# Patient Record
Sex: FEMALE | Race: WHITE | NOT HISPANIC OR LATINO | URBAN - METROPOLITAN AREA
[De-identification: names, ages, dates, MRNs, and addresses within clinical notes are randomized per-mention and may not be internally consistent; named-entity substitution may affect disease eponyms.]

---

## 2017-05-10 ENCOUNTER — IMPORTED ENCOUNTER (OUTPATIENT)
Dept: URBAN - METROPOLITAN AREA CLINIC 1 | Facility: CLINIC | Age: 72
End: 2017-05-10

## 2017-05-10 PROBLEM — H25.13: Noted: 2017-05-10

## 2017-05-10 PROBLEM — H40.1122: Noted: 2017-05-10

## 2017-05-10 PROBLEM — H40.1113: Noted: 2017-05-10

## 2017-05-10 PROCEDURE — 92014 COMPRE OPH EXAM EST PT 1/>: CPT

## 2017-05-10 PROCEDURE — 92133 CPTRZD OPH DX IMG PST SGM ON: CPT

## 2017-09-19 ENCOUNTER — IMPORTED ENCOUNTER (OUTPATIENT)
Dept: URBAN - METROPOLITAN AREA CLINIC 1 | Facility: CLINIC | Age: 72
End: 2017-09-19

## 2017-09-19 PROCEDURE — 92012 INTRM OPH EXAM EST PATIENT: CPT

## 2017-09-19 NOTE — PATIENT DISCUSSION
1. COAG OU- Severe OD/ Moderate  OS -Patient to continue with current gtt regimen(Betagan OU BID). Patient advised to be compliant with gtts. Condition was discussed with patient and patient understands. Will continue to monitor patient for any progression in condition. Patient was advised to call us with any problems questions or concerns. 2.  Cataract OD: Observe for now without intervention. The patient was advised to contact us if any change or worsening of vision3. Pseudophakia OS 4. Return for an appointment in 4 months for 10. with Dr. Jon Almazan.

## 2017-09-20 PROBLEM — H25.11: Noted: 2017-09-20

## 2017-09-20 PROBLEM — H40.1122: Noted: 2017-09-20

## 2017-09-20 PROBLEM — Z96.1: Noted: 2017-09-20

## 2017-09-20 PROBLEM — H40.1113: Noted: 2017-09-20

## 2017-11-02 ENCOUNTER — TELEPHONE (OUTPATIENT)
Dept: ORTHOPEDIC SURGERY | Age: 72
End: 2017-11-02

## 2017-11-02 NOTE — TELEPHONE ENCOUNTER
Patient called asking if Dr. Brian Mchuhg would be able to refer her to a doctor she can see regarding her high blood pressure. She is requesting a call back. Please advise patient at [phone].

## 2017-11-16 NOTE — TELEPHONE ENCOUNTER
PATIENT CALLED AGAIN FOR , AND IS REQUESTING A CALL BACK AS SOON AS POSSIBLE IN REGARDS TO THE MESSAGE SHE LEFT ON 11/02/17; ABOUT BEING REFERRED TO A DOCTOR THAT CAN HELP HER WITH HER BLOOD PRESSURE. PATIENT TEL. [phone].

## 2017-11-20 ENCOUNTER — TELEPHONE (OUTPATIENT)
Dept: ORTHOPEDIC SURGERY | Age: 72
End: 2017-11-20

## 2017-11-20 NOTE — TELEPHONE ENCOUNTER
Pt called and spoke with nurse derrell carolina this past Friday 11/17--   States she gave her information regarding a physician at the [address] [place] [address] location/pcp info?  Pt lost that information     please call pt at,  K#[phone]

## 2017-11-22 ENCOUNTER — OFFICE VISIT (OUTPATIENT)
Dept: FAMILY MEDICINE CLINIC | Age: 72
End: 2017-11-22

## 2017-11-22 VITALS
OXYGEN SATURATION: 98 % | WEIGHT: 181 LBS | SYSTOLIC BLOOD PRESSURE: 192 MMHG | HEIGHT: 63 IN | HEART RATE: 70 BPM | TEMPERATURE: 96.6 F | RESPIRATION RATE: 16 BRPM | DIASTOLIC BLOOD PRESSURE: 103 MMHG | BODY MASS INDEX: 32.07 KG/M2

## 2017-11-22 DIAGNOSIS — N81.10 FEMALE BLADDER PROLAPSE: ICD-10-CM

## 2017-11-22 DIAGNOSIS — E03.9 HYPOTHYROIDISM, UNSPECIFIED TYPE: ICD-10-CM

## 2017-11-22 DIAGNOSIS — Z86.73 HISTORY OF STROKE: ICD-10-CM

## 2017-11-22 DIAGNOSIS — I10 HYPERTENSION, UNSPECIFIED TYPE: Primary | ICD-10-CM

## 2017-11-22 DIAGNOSIS — K63.5 POLYP OF COLON, UNSPECIFIED PART OF COLON, UNSPECIFIED TYPE: ICD-10-CM

## 2017-11-22 DIAGNOSIS — E66.9 OBESITY (BMI 30.0-34.9): ICD-10-CM

## 2017-11-22 RX ORDER — HYDROCHLOROTHIAZIDE 25 MG/1
25 TABLET ORAL DAILY
Qty: 30 TAB | Refills: 0 | Status: SHIPPED | OUTPATIENT
Start: 2017-11-22 | End: 2019-03-19

## 2017-11-22 RX ORDER — HYDROCHLOROTHIAZIDE 25 MG/1
25 TABLET ORAL
Refills: 0 | COMMUNITY
Start: 2017-11-08 | End: 2017-11-22

## 2017-11-22 NOTE — PROGRESS NOTES
INTERNAL MEDICINE INITIAL PROVIDER VISIT    SUBJECTIVE:     Chief Complaint   Patient presents with   [facility] Hypertension     request new BP medication       HPI: 70 y.o. female with PMHx significant for uncontrolled HTN is here for the above chief complaint(s). Hypertension: Today BP is uncontrolled. Taking HCTZ 25 mg twice a week because afraid of \"damage to kidneys. \" Has not had damage to kidneys related to daily HCTZ. No kidney damage related to any BP medicines in past. Concern related to possible side effects. Denies headache, lightheadedness, dizziness, vision changes, chest pain, rapid/irregular heart rate, shortness of breath, cough, abdominal pain, leg swelling, leg pain. \"I can't exercise related to knee pain. Decreased UOP with diovan and losartan. 50.1 oz of water per day. And 50 oz during the night when wakes up with dry mouth. Bladder Prolapse: request PT. Will request records      ROS: AS PER HPI    Current Outpatient Prescriptions   Medication Sig    OTHER TUMERIC    ASCORBATE CALCIUM (VITAMIN C PO) Take  by mouth.  potassium 99 mg tablet Take 99 mg by mouth daily.  hydroCHLOROthiazide (HYDRODIURIL) 25 mg tablet Take 1 Tab by mouth daily.  cholecalciferol, vitamin D3, 2,000 unit tab Take 2,500 Units by mouth daily.  b complex vitamins tablet Take 1 Tab by mouth daily.  BETAGAN 0.5 % ophthalmic solution      No current facility-administered medications for this visit.       Health Maintenance   Topic Date Due    Hepatitis C Screening  1945    BREAST CANCER SCRN MAMMOGRAM  11/25/1995    FOBT Q 1 YEAR AGE 50-75  11/25/1995    GLAUCOMA SCREENING Q2Y  11/25/2010    OSTEOPOROSIS SCREENING (DEXA)  11/25/2010    MEDICARE YEARLY EXAM  11/25/2010    Pneumococcal 65+ Low/Medium Risk (2 of 2 - PPSV23) 11/22/2018    DTaP/Tdap/Td series (2 - Td) 11/22/2027    ZOSTER VACCINE AGE 60>  Addressed    Influenza Age 5 to Adult  Addressed       Medications and Allergies: Reviewed and confirmed in the chart    Past Medical Hx: Reviewed and confirmed in the chart  Past Medical History:   Diagnosis Date    Bronchitis     Dry hair     Glaucoma     Hemorrhoids     Hypertension     Hypothyroidism     Obesity (BMI 30.0-34. 9)     Osteoarthritis     RIGHT KNEE    Stroke [facility]) 1994    DIZZINESS        Family Hx, Surgical Hx, Social Hx: Reviewed and updated in EMR    OBJECTIVE:  Vitals:    11/22/17 1534 11/22/17 1544 11/22/17 1551   BP: (!) 193/112 (!) 192/112 (!) 192/103   Pulse: 70     Resp: 16     Temp: 96.6 °F (35.9 °C)     TempSrc: Oral     SpO2: 98%     Weight: 181 lb (82.1 kg)     Height: 5' 3\" (1.6 m)         BP Readings from Last 3 Encounters:   11/22/17 (!) 192/103   12/28/16 (!) 183/111   12/21/16 (!) 184/103     Wt Readings from Last 3 Encounters:   11/22/17 181 lb (82.1 kg)   12/28/16 171 lb (77.6 kg)   12/21/16 171 lb (77.6 kg)       General: Elderly woman in no acute distress  HEENT: Head is atraumatic normo-cephalic. CVS: Heart regular, rate, and rhythm. Audible S1 and S2. PULM: Lungs clear to auscultation bilaterally. No wheezes, rales or rhonchi. GI: Positive bowel sounds, soft, nondistended, non-tender. EXT: 2+ dorsalis pedis pulses bilaterally. No pedal edema bilaterally  Neuro: Alert and oriented x3. No gross focal deficits appreciable. MSE: Mood irritable, affect congruent and reactive. LABS OUTSIDE REVIEWED:  TO BE SCANNED IN CHART    Nursing Notes Reviewed    ASSESSMENT AND PLAN    ICD-10-CM ICD-9-CM    1. Hypertension, unspecified type I10 401.9 hydroCHLOROthiazide (HYDRODIURIL) 25 mg tablet encuraged to take EVERY DAY  Increase water to 2 liters per day  BMP in 1 week  RTC in 1 week  GIFTY for past records   2. History of stroke Z86.73 V12.54 hydroCHLOROthiazide (HYDRODIURIL) 25 mg tablet  Given warning signs for recurrent CVA   3. Hypothyroidism, unspecified type E03.9 244.9 TSH ml most recent lab  Continue to monitor   4.  Polyp of colon, unspecified part of colon, unspecified type K63.5 211.3 GIFTY signed   5. Obesity (BMI 30.0-34. 9) E66.9 278.00 Discuss next visit   6. Bladder prolapse: Pt to call with name of uro-gynecologist and records    Orders Placed This Encounter    OTHER    ASCORBATE CALCIUM (VITAMIN C PO)    potassium 99 mg tablet    DISCONTD: hydroCHLOROthiazide (HYDRODIURIL) 25 mg tablet    hydroCHLOROthiazide (HYDRODIURIL) 25 mg tablet       Future Appointments  Date Time Provider Kameron Short   11/29/2017 3:30 PM Cielo Dubon MD [id number] [place] [address]       AVS printed and provided to patient    Assessment and plan above discussed with patient, patient voiced understanding and agreement with plan. More than 50% of this 45 min visit was spent counseling the patient face to face about above health conditions      Cielo Dubon M.D.   [address], [address] [place] [address] [phone]  [place] [phone]

## 2017-11-22 NOTE — PATIENT INSTRUCTIONS
Start taking HCTZ 25 mg daily for blood pressure    Max 2 grams of salt per day    Try yoga for exercise 3x per week    RETURN WITH NAME OF URO-GYNECOLOGIST THAT DIAGNOSED WITH YOU WITH BLADDER PROLAPSE SO WE CAN GET RECORDS AND REFER YOU TO PHYSICAL THERAPY      DASH Diet: Care Instructions  Your Care Instructions    The DASH diet is an eating plan that can help lower your blood pressure. DASH stands for Dietary Approaches to Stop Hypertension. Hypertension is high blood pressure. The DASH diet focuses on eating foods that are high in calcium, potassium, and magnesium. These nutrients can lower blood pressure. The foods that are highest in these nutrients are fruits, vegetables, low-fat dairy products, nuts, seeds, and legumes. But taking calcium, potassium, and magnesium supplements instead of eating foods that are high in those nutrients does not have the same effect. The DASH diet also includes whole grains, fish, and poultry. The DASH diet is one of several lifestyle changes your doctor may recommend to lower your high blood pressure. Your doctor may also want you to decrease the amount of sodium in your diet. Lowering sodium while following the DASH diet can lower blood pressure even further than just the DASH diet alone. Follow-up care is a key part of your treatment and safety. Be sure to make and go to all appointments, and call your doctor if you are having problems. It's also a good idea to know your test results and keep a list of the medicines you take. How can you care for yourself at home? Following the DASH diet  · Eat 4 to 5 servings of fruit each day. A serving is 1 medium-sized piece of fruit, ½ cup chopped or canned fruit, 1/4 cup dried fruit, or 4 ounces (½ cup) of fruit juice. Choose fruit more often than fruit juice. · Eat 4 to 5 servings of vegetables each day.  A serving is 1 cup of lettuce or raw leafy vegetables, ½ cup of chopped or cooked vegetables, or 4 ounces (½ cup) of vegetable variety of cut-up vegetables with a low-fat dip as an appetizer instead of chips and dip. ¨ Sprinkle sunflower seeds or chopped almonds over salads. Or try adding chopped walnuts or almonds to cooked vegetables. ¨ Try some vegetarian meals using beans and peas. Add garbanzo or kidney beans to salads. Make burritos and tacos with mashed garcia beans or black beans. Where can you learn more? Go to http://john-gi.info/. Enter I307 in the search box to learn more about \"DASH Diet: Care Instructions. \"  Current as of: September 21, 2016  Content Version: 11.4  © 0966-2092 [employer]. Care instructions adapted under license by [employer] (which disclaims liability or warranty for this information). If you have questions about a medical condition or this instruction, always ask your healthcare professional. [facility] [address] any warranty or liability for your use of this information.

## 2017-11-22 NOTE — PROGRESS NOTES
Chief Complaint   Patient presents with    Establish Care    Hypertension     request new BP medication

## 2017-11-22 NOTE — MR AVS SNAPSHOT
Visit Information Date & Time Provider Department Dept. Phone Encounter #  
 11/22/2017  3:30 PM Tere Sarkar MD [address] [phone] [id number] Follow-up Instructions Return in about 1 week (around 11/29/2017), or if symptoms worsen or fail to improve, for HTN. Upcoming Health Maintenance Date Due Hepatitis C Screening 1945 BREAST CANCER SCRN MAMMOGRAM 11/25/1995 FOBT Q 1 YEAR AGE 50-75 11/25/1995 GLAUCOMA SCREENING Q2Y 11/25/2010 OSTEOPOROSIS SCREENING (DEXA) 11/25/2010 MEDICARE YEARLY EXAM 11/25/2010 Pneumococcal 65+ Low/Medium Risk (2 of 2 - PPSV23) 11/22/2018 DTaP/Tdap/Td series (2 - Td) 11/22/2027 Allergies as of 11/22/2017  Review Complete On: 11/22/2017 By: Tere Sarkar MD  
  
 Severity Noted Reaction Type Reactions Antihistamine [Diphenhydramine Hcl]  03/11/2015    Other (comments) \"stops bladder\" Erythromycin  01/27/2016    Hives Iodine  03/11/2015    Other (comments) Stops bladder Penicillin G  03/11/2015    Rash Sulfur  03/11/2015    Other (comments) \"stops bladder\" Current Immunizations  Never Reviewed No immunizations on file. Not reviewed this visit You Were Diagnosed With   
  
 Codes Comments Hypertension, unspecified type    -  Primary ICD-10-CM: I10 
ICD-9-CM: 401.9 History of stroke     ICD-10-CM: Z86.73 
ICD-9-CM: V12.54 Hypothyroidism, unspecified type     ICD-10-CM: E03.9 ICD-9-CM: 244.9 Polyp of colon, unspecified part of colon, unspecified type     ICD-10-CM: K63.5 ICD-9-CM: 211.3 Obesity (BMI 30.0-34.9)     ICD-10-CM: A10.7 ICD-9-CM: 278.00 Vitals BP Pulse Temp Resp Height(growth percentile) Weight(growth percentile) (!) 192/103 70 96.6 °F (35.9 °C) (Oral) 16 5' 3\" (1.6 m) 181 lb (82.1 kg) LMP SpO2 BMI OB Status Smoking Status (LMP Unknown) 98% 32.06 kg/m2 Hysterectomy Never Smoker Vitals History BMI and BSA Data Body Mass Index Body Surface Area 32.06 kg/m 2 1.91 m 2 Preferred Pharmacy Pharmacy Name Phone [address], [address] [facility] [phone] Your Updated Medication List  
  
   
This list is accurate as of: 11/22/17  4:20 PM.  Always use your most recent med list.  
  
  
  
  
 b complex vitamins tablet Take 1 Tab by mouth daily. BETAGAN 0.5 % ophthalmic solution Generic drug:  levobunolol  
  
 cholecalciferol (vitamin D3) 2,000 unit Tab Take 2,500 Units by mouth daily. hydroCHLOROthiazide 25 mg tablet Commonly known as:  HYDRODIURIL Take 1 Tab by mouth daily. OTHER  
TUMERIC  
  
 potassium 99 mg tablet Take 99 mg by mouth daily. VITAMIN C PO Take  by mouth. Prescriptions Sent to Pharmacy Refills  
 hydroCHLOROthiazide (HYDRODIURIL) 25 mg tablet 0 Sig: Take 1 Tab by mouth daily. Class: Normal  
 Pharmacy: [id number] [place] [address], [facility] #: [phone] Route: Oral  
  
Follow-up Instructions Return in about 1 week (around 11/29/2017), or if symptoms worsen or fail to improve, for HTN. Patient Instructions Start taking HCTZ 25 mg daily for blood pressure Max 2 grams of salt per day Try yoga for exercise 3x per week RETURN WITH NAME OF URO-GYNECOLOGIST THAT DIAGNOSED WITH YOU WITH BLADDER PROLAPSE SO WE CAN GET RECORDS AND REFER YOU TO PHYSICAL THERAPY DASH Diet: Care Instructions Your Care Instructions The DASH diet is an eating plan that can help lower your blood pressure. DASH stands for Dietary Approaches to Stop Hypertension. Hypertension is high blood pressure. The DASH diet focuses on eating foods that are high in calcium, potassium, and magnesium. These nutrients can lower blood pressure.  The foods that are highest in these nutrients are fruits, vegetables, low-fat dairy products, nuts, seeds, and legumes. But taking calcium, potassium, and magnesium supplements instead of eating foods that are high in those nutrients does not have the same effect. The DASH diet also includes whole grains, fish, and poultry. The DASH diet is one of several lifestyle changes your doctor may recommend to lower your high blood pressure. Your doctor may also want you to decrease the amount of sodium in your diet. Lowering sodium while following the DASH diet can lower blood pressure even further than just the DASH diet alone. Follow-up care is a key part of your treatment and safety. Be sure to make and go to all appointments, and call your doctor if you are having problems. It's also a good idea to know your test results and keep a list of the medicines you take. How can you care for yourself at home? Following the DASH diet · Eat 4 to 5 servings of fruit each day. A serving is 1 medium-sized piece of fruit, ½ cup chopped or canned fruit, 1/4 cup dried fruit, or 4 ounces (½ cup) of fruit juice. Choose fruit more often than fruit juice. · Eat 4 to 5 servings of vegetables each day. A serving is 1 cup of lettuce or raw leafy vegetables, ½ cup of chopped or cooked vegetables, or 4 ounces (½ cup) of vegetable juice. Choose vegetables more often than vegetable juice. · Get 2 to 3 servings of low-fat and fat-free dairy each day. A serving is 8 ounces of milk, 1 cup of yogurt, or 1 ½ ounces of cheese. · Eat 6 to 8 servings of grains each day. A serving is 1 slice of bread, 1 ounce of dry cereal, or ½ cup of cooked rice, pasta, or cooked cereal. Try to choose whole-grain products as much as possible. · Limit lean meat, poultry, and fish to 2 servings each day. A serving is 3 ounces, about the size of a deck of cards. · Eat 4 to 5 servings of nuts, seeds, and legumes (cooked dried beans, lentils, and split peas) each week.  A serving is 1/3 cup of nuts, 2 tablespoons of seeds, or ½ cup of cooked beans or peas. · Limit fats and oils to 2 to 3 servings each day. A serving is 1 teaspoon of vegetable oil or 2 tablespoons of salad dressing. · Limit sweets and added sugars to 5 servings or less a week. A serving is 1 tablespoon jelly or jam, ½ cup sorbet, or 1 cup of lemonade. · Eat less than 2,300 milligrams (mg) of sodium a day. If you limit your sodium to 1,500 mg a day, you can lower your blood pressure even more. Tips for success · Start small. Do not try to make dramatic changes to your diet all at once. You might feel that you are missing out on your favorite foods and then be more likely to not follow the plan. Make small changes, and stick with them. Once those changes become habit, add a few more changes. · Try some of the following: ¨ Make it a goal to eat a fruit or vegetable at every meal and at snacks. This will make it easy to get the recommended amount of fruits and vegetables each day. ¨ Try yogurt topped with fruit and nuts for a snack or healthy dessert. ¨ Add lettuce, tomato, cucumber, and onion to sandwiches. ¨ Combine a ready-made pizza crust with low-fat mozzarella cheese and lots of vegetable toppings. Try using tomatoes, squash, spinach, broccoli, carrots, cauliflower, and onions. ¨ Have a variety of cut-up vegetables with a low-fat dip as an appetizer instead of chips and dip. ¨ Sprinkle sunflower seeds or chopped almonds over salads. Or try adding chopped walnuts or almonds to cooked vegetables. ¨ Try some vegetarian meals using beans and peas. Add garbanzo or kidney beans to salads. Make burritos and tacos with mashed garcia beans or black beans. Where can you learn more? Go to http://john-gi.info/. Enter M490 in the search box to learn more about \"DASH Diet: Care Instructions. \" Current as of: September 21, 2016 Content Version: 11.4 © 5066-2348 [employer].  Care instructions adapted under license by [employer]en Calv[employer] (which disclaims liability or warranty for this information). If you have questions about a medical condition or this instruction, always ask your healthcare professional. [address] any warranty or liability for your use of this information. Introducing [facility]! Dear Livan Casper: Thank you for requesting a [employer] account. Our records indicate that you already have an active [employer] account. You can access your account anytime at [email or web address]. [employer] Did you know that you can access your hospital and ER discharge instructions at any time in [employer]? You can also review all of your test results from your hospital stay or ER visit. Additional Information If you have questions, please visit the Frequently Asked Questions section of the [employer] website at [email or web address]/. Remember, [employer] is NOT to be used for urgent needs. For medical emergencies, dial 911. Now available from your iPhone and Android! Please provide this summary of care documentation to your next provider. Your primary care clinician is listed as [id number] [place]. If you have any questions after today's visit, please call [phone].

## 2017-11-24 PROBLEM — E78.5 HLD (HYPERLIPIDEMIA): Status: ACTIVE | Noted: 2017-11-24

## 2017-11-24 PROBLEM — R73.03 PREDIABETES: Status: ACTIVE | Noted: 2017-11-24

## 2017-11-29 ENCOUNTER — OFFICE VISIT (OUTPATIENT)
Dept: FAMILY MEDICINE CLINIC | Age: 72
End: 2017-11-29

## 2017-11-29 ENCOUNTER — HOSPITAL ENCOUNTER (OUTPATIENT)
Dept: LAB | Age: 72
Discharge: HOME | End: 2017-11-29
Payer: COMMERCIAL

## 2017-11-29 VITALS
DIASTOLIC BLOOD PRESSURE: 102 MMHG | OXYGEN SATURATION: 95 % | HEIGHT: 63 IN | SYSTOLIC BLOOD PRESSURE: 180 MMHG | WEIGHT: 177 LBS | RESPIRATION RATE: 16 BRPM | BODY MASS INDEX: 31.36 KG/M2 | TEMPERATURE: 96.4 F | HEART RATE: 78 BPM

## 2017-11-29 DIAGNOSIS — I10 HYPERTENSION, UNSPECIFIED TYPE: Primary | ICD-10-CM

## 2017-11-29 DIAGNOSIS — I10 HYPERTENSION, UNSPECIFIED TYPE: ICD-10-CM

## 2017-11-29 LAB
ANION GAP SERPL CALC-SCNC: 13 MMOL/L (ref 3–18)
BUN SERPL-MCNC: 26 MG/DL (ref 7–18)
BUN/CREAT SERPL: 27 (ref 12–20)
CALCIUM SERPL-MCNC: 9.5 MG/DL (ref 8.5–10.1)
CHLORIDE SERPL-SCNC: 103 MMOL/L (ref 100–108)
CO2 SERPL-SCNC: 24 MMOL/L (ref 21–32)
CREAT SERPL-MCNC: 0.98 MG/DL (ref 0.6–1.3)
GLUCOSE SERPL-MCNC: 109 MG/DL (ref 74–99)
POTASSIUM SERPL-SCNC: 3.8 MMOL/L (ref 3.5–5.5)
SODIUM SERPL-SCNC: 140 MMOL/L (ref 136–145)

## 2017-11-29 PROCEDURE — 80048 BASIC METABOLIC PNL TOTAL CA: CPT | Performed by: INTERNAL MEDICINE

## 2017-11-29 RX ORDER — METOPROLOL SUCCINATE 25 MG/1
25 TABLET, EXTENDED RELEASE ORAL DAILY
Qty: 30 TAB | Refills: 1 | Status: SHIPPED | OUTPATIENT
Start: 2017-11-29 | End: 2018-08-07

## 2017-11-29 NOTE — PATIENT INSTRUCTIONS
START TAKING METOPROLOL XL 25 MG DAILY  CONTINUE HCTZ 25 MG DAILY  We will let you know the results of your blood and urine test when they come in. We will call if abnormal and send a letter if normal.  CHECK BLOOD PRESSURE ONCE A DAY IN MID DAY AFTER SITTING FOR A FEW MINUTES, LEGS UNCROSSED AND BACK RESTING  WRITE NUMBERS DOWN  BRING TO CLINIC NEXT VISIT IN 3 WEEKS       DASH Diet: Care Instructions  Your Care Instructions    The DASH diet is an eating plan that can help lower your blood pressure. DASH stands for Dietary Approaches to Stop Hypertension. Hypertension is high blood pressure. The DASH diet focuses on eating foods that are high in calcium, potassium, and magnesium. These nutrients can lower blood pressure. The foods that are highest in these nutrients are fruits, vegetables, low-fat dairy products, nuts, seeds, and legumes. But taking calcium, potassium, and magnesium supplements instead of eating foods that are high in those nutrients does not have the same effect. The DASH diet also includes whole grains, fish, and poultry. The DASH diet is one of several lifestyle changes your doctor may recommend to lower your high blood pressure. Your doctor may also want you to decrease the amount of sodium in your diet. Lowering sodium while following the DASH diet can lower blood pressure even further than just the DASH diet alone. Follow-up care is a key part of your treatment and safety. Be sure to make and go to all appointments, and call your doctor if you are having problems. It's also a good idea to know your test results and keep a list of the medicines you take. How can you care for yourself at home? Following the DASH diet  · Eat 4 to 5 servings of fruit each day. A serving is 1 medium-sized piece of fruit, ½ cup chopped or canned fruit, 1/4 cup dried fruit, or 4 ounces (½ cup) of fruit juice. Choose fruit more often than fruit juice. · Eat 4 to 5 servings of vegetables each day.  A serving is 1 cup of lettuce or raw leafy vegetables, ½ cup of chopped or cooked vegetables, or 4 ounces (½ cup) of vegetable juice. Choose vegetables more often than vegetable juice. · Get 2 to 3 servings of low-fat and fat-free dairy each day. A serving is 8 ounces of milk, 1 cup of yogurt, or 1 ½ ounces of cheese. · Eat 6 to 8 servings of grains each day. A serving is 1 slice of bread, 1 ounce of dry cereal, or ½ cup of cooked rice, pasta, or cooked cereal. Try to choose whole-grain products as much as possible. · Limit lean meat, poultry, and fish to 2 servings each day. A serving is 3 ounces, about the size of a deck of cards. · Eat 4 to 5 servings of nuts, seeds, and legumes (cooked dried beans, lentils, and split peas) each week. A serving is 1/3 cup of nuts, 2 tablespoons of seeds, or ½ cup of cooked beans or peas. · Limit fats and oils to 2 to 3 servings each day. A serving is 1 teaspoon of vegetable oil or 2 tablespoons of salad dressing. · Limit sweets and added sugars to 5 servings or less a week. A serving is 1 tablespoon jelly or jam, ½ cup sorbet, or 1 cup of lemonade. · Eat less than 2,300 milligrams (mg) of sodium a day. If you limit your sodium to 1,500 mg a day, you can lower your blood pressure even more. Tips for success  · Start small. Do not try to make dramatic changes to your diet all at once. You might feel that you are missing out on your favorite foods and then be more likely to not follow the plan. Make small changes, and stick with them. Once those changes become habit, add a few more changes. · Try some of the following:  ¨ Make it a goal to eat a fruit or vegetable at every meal and at snacks. This will make it easy to get the recommended amount of fruits and vegetables each day. ¨ Try yogurt topped with fruit and nuts for a snack or healthy dessert. ¨ Add lettuce, tomato, cucumber, and onion to sandwiches.   ¨ Combine a ready-made pizza crust with low-fat mozzarella cheese and lots of vegetable toppings. Try using tomatoes, squash, spinach, broccoli, carrots, cauliflower, and onions. ¨ Have a variety of cut-up vegetables with a low-fat dip as an appetizer instead of chips and dip. ¨ Sprinkle sunflower seeds or chopped almonds over salads. Or try adding chopped walnuts or almonds to cooked vegetables. ¨ Try some vegetarian meals using beans and peas. Add garbanzo or kidney beans to salads. Make burritos and tacos with mashed garcia beans or black beans. Where can you learn more? Go to http://john-gi.info/. Enter H898 in the search box to learn more about \"DASH Diet: Care Instructions. \"  Current as of: September 21, 2016  Content Version: 11.4  © 8304-1422 [email or web address]. Care instructions adapted under license by [employer] (which disclaims liability or warranty for this information). If you have questions about a medical condition or this instruction, always ask your healthcare professional. [address] any warranty or liability for your use of this information.

## 2017-11-29 NOTE — PROGRESS NOTES
Internal Medicine Follow Up Visit Note    Chief Complaint   Patient presents with    Hypertension       HPI:  Taylor Chan is a 67 y.o.  female with history significant for uncontrolled HTN is here for the above complaint(s). Hypertension: Today BP is not controlled. Taking HCTZ 25 mg daily. No side effects from medications but does have increased urination. Medication good compliance for last week. Denies headache, lightheadedness, dizziness, vision changes, chest pain, rapid/irregular heart rate, shortness of breath, cough, abdominal pain, leg swelling, leg pain. Trying to drink more water per day. /79 earlier today before appointment. BP log is at home. ROS: as per HPI    Current Outpatient Prescriptions   Medication Sig    metoprolol succinate (TOPROL-XL) 25 mg XL tablet Take 1 Tab by mouth daily.  OTHER TUMERIC    ASCORBATE CALCIUM (VITAMIN C PO) Take  by mouth.  potassium 99 mg tablet Take 99 mg by mouth daily.  hydroCHLOROthiazide (HYDRODIURIL) 25 mg tablet Take 1 Tab by mouth daily.  cholecalciferol, vitamin D3, 2,000 unit tab Take 2,500 Units by mouth daily.  b complex vitamins tablet Take 1 Tab by mouth daily.  BETAGAN 0.5 % ophthalmic solution      No current facility-administered medications for this visit. Health Maintenance   Topic Date Due    Hepatitis C Screening  1945    BREAST CANCER SCRN MAMMOGRAM  11/25/1995    FOBT Q 1 YEAR AGE 50-75  11/25/1995    GLAUCOMA SCREENING Q2Y  11/25/2010    OSTEOPOROSIS SCREENING (DEXA)  11/25/2010    MEDICARE YEARLY EXAM  11/25/2010    Pneumococcal 65+ Low/Medium Risk (2 of 2 - PPSV23) 11/22/2018    DTaP/Tdap/Td series (2 - Td) 11/22/2027    ZOSTER VACCINE AGE 60>  Addressed    Influenza Age 5 to Adult  Addressed       There is no immunization history on file for this patient. Allergies and Medications: Reviewed and updated in EMR.      Past Medical History:   Diagnosis Date    Bronchitis     Dry hair     Glaucoma     Hemorrhoids     Hypertension     Hypothyroidism     Obesity (BMI 30.0-34. 9)     Osteoarthritis     RIGHT KNEE    Stroke [facility]) 1994    DIZZINESS        Family History, Social History, Past Medical History, Surgical History: Reviewed and updated in EMR as appropriate. OBJECTIVE:   Visit Vitals    BP (!) 180/102    Pulse 78    Temp 96.4 °F (35.8 °C) (Oral)    Resp 16    Ht 5' 3\" (1.6 m)    Wt 177 lb (80.3 kg)    LMP  (LMP Unknown)    SpO2 95%    BMI 31.35 kg/m2        BP Readings from Last 3 Encounters:   11/29/17 (!) 180/102   11/22/17 (!) 192/103   12/28/16 (!) 183/111     Wt Readings from Last 3 Encounters:   11/29/17 177 lb (80.3 kg)   11/22/17 181 lb (82.1 kg)   12/28/16 171 lb (77.6 kg)       General: Pleasant elderly woman in no acute distress    Nursing Notes Reviewed. ASSESSMENT/PLAN:      ICD-10-CM ICD-9-CM    1. Hypertension, unspecified type I10 401.9 metoprolol succinate (TOPROL-XL) 25 mg XL tablet  Continue HCTZ 25 mg daily  BP log  DASH diet      METABOLIC PANEL, BASIC  rtc in 3 weeks       Requested Prescriptions     Signed Prescriptions Disp Refills    metoprolol succinate (TOPROL-XL) 25 mg XL tablet 30 Tab 1     Sig: Take 1 Tab by mouth daily. Patient verbalized understanding and agreement with the plan. Patient was given an after-visit summary. Follow-up Disposition: Not on File or sooner if worsening symptoms. More than 50% of this 25 min visit was spent counseling the patient face to face about above health conditions      Siria Keller M.D.   [address], [address] [place] [address], [address] [place] [address] [phone]  [employer] - [phone]

## 2017-11-29 NOTE — MR AVS SNAPSHOT
Visit Information Date & Time Provider Department Dept. Phone Encounter #  
 11/29/2017  3:30 PM Jaiden Kidd MD [address] [facility] [phone] [id number] Follow-up Instructions Return in about 3 weeks (around 12/20/2017) for htn. Your Appointments 11/29/2017  3:30 PM  
ROUTINE CARE with Jaiden Kidd MD  
[address] [facility] ([facility]) Appt Note: Return in about 1 week (around 11/29/2017), or if symptoms worsen or fail to improve, for HTN  
 [address] [facility] [address] [address]  
[phone]  
  
   
 [address] [facility] [address] Upcoming Health Maintenance Date Due Hepatitis C Screening 1945 BREAST CANCER SCRN MAMMOGRAM 11/25/1995 FOBT Q 1 YEAR AGE 50-75 11/25/1995 GLAUCOMA SCREENING Q2Y 11/25/2010 OSTEOPOROSIS SCREENING (DEXA) 11/25/2010 MEDICARE YEARLY EXAM 11/25/2010 Pneumococcal 65+ Low/Medium Risk (2 of 2 - PPSV23) 11/22/2018 DTaP/Tdap/Td series (2 - Td) 11/22/2027 Allergies as of 11/29/2017  Review Complete On: 11/29/2017 By: Cat Dennis LPN Severity Noted Reaction Type Reactions Antihistamine [Diphenhydramine Hcl]  03/11/2015    Other (comments) \"stops bladder\" Erythromycin  01/27/2016    Hives Iodine  03/11/2015    Other (comments) Stops bladder Penicillin G  03/11/2015    Rash Sulfur  03/11/2015    Other (comments) \"stops bladder\" Current Immunizations  Never Reviewed No immunizations on file. Not reviewed this visit You Were Diagnosed With   
  
 Codes Comments Hypertension, unspecified type    -  Primary ICD-10-CM: I10 
ICD-9-CM: 401.9 Vitals BP Pulse Temp Resp Height(growth percentile) Weight(growth percentile) (!) 180/102 78 96.4 °F (35.8 °C) (Oral) 16 5' 3\" (1.6 m) 177 lb (80.3 kg) LMP SpO2 BMI OB Status Smoking Status (LMP Unknown) 95% 31.35 kg/m2 Hysterectomy Never Smoker Vitals History BMI and BSA Data Body Mass Index Body Surface Area  
 31.35 kg/m 2 1.89 m 2 Preferred Pharmacy Pharmacy Name Phone [address], [address] [facility] [phone] Your Updated Medication List  
  
   
This list is accurate as of: 11/29/17  3:26 PM.  Always use your most recent med list.  
  
  
  
  
 b complex vitamins tablet Take 1 Tab by mouth daily. BETAGAN 0.5 % ophthalmic solution Generic drug:  levobunolol  
  
 cholecalciferol (vitamin D3) 2,000 unit Tab Take 2,500 Units by mouth daily. hydroCHLOROthiazide 25 mg tablet Commonly known as:  HYDRODIURIL Take 1 Tab by mouth daily. metoprolol succinate 25 mg XL tablet Commonly known as:  TOPROL-XL Take 1 Tab by mouth daily. OTHER  
TUMERIC  
  
 potassium 99 mg tablet Take 99 mg by mouth daily. VITAMIN C PO Take  by mouth. Prescriptions Sent to Pharmacy Refills  
 metoprolol succinate (TOPROL-XL) 25 mg XL tablet 1 Sig: Take 1 Tab by mouth daily. Class: Normal  
 Pharmacy: [id number], [address] [facility] Ph #: [phone] Route: Oral  
  
Follow-up Instructions Return in about 3 weeks (around 12/20/2017) for htn. To-Do List   
 11/29/2017 Lab:  METABOLIC PANEL, BASIC Patient Instructions START TAKING METOPROLOL XL 25 MG DAILY CONTINUE HCTZ 25 MG DAILY We will let you know the results of your blood and urine test when they come in. We will call if abnormal and send a letter if normal. 
CHECK BLOOD PRESSURE ONCE A DAY IN MID DAY AFTER SITTING FOR A FEW MINUTES, LEGS UNCROSSED AND BACK RESTING 
WRITE NUMBERS DOWN 
BRING TO CLINIC NEXT VISIT IN 3 WEEKS 
 
  
DASH Diet: Care Instructions Your Care Instructions The DASH diet is an eating plan that can help lower your blood pressure. DASH stands for Dietary Approaches to Stop Hypertension. Hypertension is high blood pressure. The DASH diet focuses on eating foods that are high in calcium, potassium, and magnesium. These nutrients can lower blood pressure. The foods that are highest in these nutrients are fruits, vegetables, low-fat dairy products, nuts, seeds, and legumes. But taking calcium, potassium, and magnesium supplements instead of eating foods that are high in those nutrients does not have the same effect. The DASH diet also includes whole grains, fish, and poultry. The DASH diet is one of several lifestyle changes your doctor may recommend to lower your high blood pressure. Your doctor may also want you to decrease the amount of sodium in your diet. Lowering sodium while following the DASH diet can lower blood pressure even further than just the DASH diet alone. Follow-up care is a key part of your treatment and safety. Be sure to make and go to all appointments, and call your doctor if you are having problems. It's also a good idea to know your test results and keep a list of the medicines you take. How can you care for yourself at home? Following the DASH diet · Eat 4 to 5 servings of fruit each day. A serving is 1 medium-sized piece of fruit, ½ cup chopped or canned fruit, 1/4 cup dried fruit, or 4 ounces (½ cup) of fruit juice. Choose fruit more often than fruit juice. · Eat 4 to 5 servings of vegetables each day. A serving is 1 cup of lettuce or raw leafy vegetables, ½ cup of chopped or cooked vegetables, or 4 ounces (½ cup) of vegetable juice. Choose vegetables more often than vegetable juice. · Get 2 to 3 servings of low-fat and fat-free dairy each day. A serving is 8 ounces of milk, 1 cup of yogurt, or 1 ½ ounces of cheese. · Eat 6 to 8 servings of grains each day.  A serving is 1 slice of bread, 1 ounce of dry cereal, or ½ cup of cooked rice, pasta, or cooked cereal. Try to choose whole-grain products as much as possible. · Limit lean meat, poultry, and fish to 2 servings each day. A serving is 3 ounces, about the size of a deck of cards. · Eat 4 to 5 servings of nuts, seeds, and legumes (cooked dried beans, lentils, and split peas) each week. A serving is 1/3 cup of nuts, 2 tablespoons of seeds, or ½ cup of cooked beans or peas. · Limit fats and oils to 2 to 3 servings each day. A serving is 1 teaspoon of vegetable oil or 2 tablespoons of salad dressing. · Limit sweets and added sugars to 5 servings or less a week. A serving is 1 tablespoon jelly or jam, ½ cup sorbet, or 1 cup of lemonade. · Eat less than 2,300 milligrams (mg) of sodium a day. If you limit your sodium to 1,500 mg a day, you can lower your blood pressure even more. Tips for success · Start small. Do not try to make dramatic changes to your diet all at once. You might feel that you are missing out on your favorite foods and then be more likely to not follow the plan. Make small changes, and stick with them. Once those changes become habit, add a few more changes. · Try some of the following: ¨ Make it a goal to eat a fruit or vegetable at every meal and at snacks. This will make it easy to get the recommended amount of fruits and vegetables each day. ¨ Try yogurt topped with fruit and nuts for a snack or healthy dessert. ¨ Add lettuce, tomato, cucumber, and onion to sandwiches. ¨ Combine a ready-made pizza crust with low-fat mozzarella cheese and lots of vegetable toppings. Try using tomatoes, squash, spinach, broccoli, carrots, cauliflower, and onions. ¨ Have a variety of cut-up vegetables with a low-fat dip as an appetizer instead of chips and dip. ¨ Sprinkle sunflower seeds or chopped almonds over salads. Or try adding chopped walnuts or almonds to cooked vegetables. ¨ Try some vegetarian meals using beans and peas.  Add garbanzo or kidney beans to salads. Make burritos and tacos with mashed garcia beans or black beans. Where can you learn more? Go to http://john-gi.info/. Enter K936 in the search box to learn more about \"DASH Diet: Care Instructions. \" Current as of: September 21, 2016 Content Version: 11.4 © 5026-5367 [employer]. Care instructions adapted under license by [employer] (which disclaims liability or warranty for this information). If you have questions about a medical condition or this instruction, always ask your healthcare professional. [place] [address] any warranty or liability for your use of this information. Introducing [facility]! Dear Richa: Thank you for requesting a scoo mobility account. Our records indicate that you already have an active scoo mobility account. You can access your account anytime at [email or web address] Did you know that you can access your hospital and ER discharge instructions at any time in scoo mobility? You can also review all of your test results from your hospital stay or ER visit. Additional Information If you have questions, please visit the Frequently Asked Questions section of the scoo mobility website at [email or web address] Remember, scoo mobility is NOT to be used for urgent needs. For medical emergencies, dial 911. Now available from your iPhone and Android! Please provide this summary of care documentation to your next provider. Your primary care clinician is listed as [facility]. If you have any questions after today's visit, please call [phone].

## 2017-11-30 ENCOUNTER — TELEPHONE (OUTPATIENT)
Dept: FAMILY MEDICINE CLINIC | Age: 72
End: 2017-11-30

## 2017-11-30 NOTE — TELEPHONE ENCOUNTER
Attempted to call patient however patient was still sleeping according to a family member. Best time to reach patient today will be after 12. This is in regards to patient's lab.

## 2017-11-30 NOTE — TELEPHONE ENCOUNTER
Lmom with [facility] [place] to let wife know I was returning call in regard to lab results. Informed him that I will return call tomorrow.

## 2017-12-21 PROBLEM — N39.46 MIXED INCONTINENCE: Status: ACTIVE | Noted: 2017-12-21

## 2018-01-11 PROBLEM — D12.2 ADENOMATOUS POLYP OF ASCENDING COLON: Status: ACTIVE | Noted: 2017-11-22

## 2019-03-19 PROBLEM — R65.10 SIRS (SYSTEMIC INFLAMMATORY RESPONSE SYNDROME) (HCC): Status: ACTIVE | Noted: 2019-03-19

## 2019-04-04 ENCOUNTER — OFFICE VISIT (OUTPATIENT)
Dept: FAMILY MEDICINE CLINIC | Age: 74
End: 2019-04-04

## 2019-04-04 VITALS
HEART RATE: 77 BPM | BODY MASS INDEX: 30.83 KG/M2 | SYSTOLIC BLOOD PRESSURE: 132 MMHG | OXYGEN SATURATION: 96 % | TEMPERATURE: 97.4 F | HEIGHT: 63 IN | WEIGHT: 174 LBS | RESPIRATION RATE: 16 BRPM | DIASTOLIC BLOOD PRESSURE: 76 MMHG

## 2019-04-04 DIAGNOSIS — M17.11 OSTEOARTHRITIS OF RIGHT KNEE, UNSPECIFIED OSTEOARTHRITIS TYPE: ICD-10-CM

## 2019-04-04 DIAGNOSIS — I10 HYPERTENSION, UNSPECIFIED TYPE: ICD-10-CM

## 2019-04-04 DIAGNOSIS — Z86.73 HISTORY OF STROKE: ICD-10-CM

## 2019-04-04 DIAGNOSIS — R41.3 MEMORY DEFICIT: ICD-10-CM

## 2019-04-04 DIAGNOSIS — E78.2 MIXED HYPERLIPIDEMIA: ICD-10-CM

## 2019-04-04 DIAGNOSIS — Z63.4 BEREAVEMENT: ICD-10-CM

## 2019-04-04 DIAGNOSIS — F32.1 CURRENT MODERATE EPISODE OF MAJOR DEPRESSIVE DISORDER WITHOUT PRIOR EPISODE (HCC): Primary | ICD-10-CM

## 2019-04-04 RX ORDER — AMLODIPINE BESYLATE 5 MG/1
5 TABLET ORAL
Qty: 90 TAB | Refills: 1 | Status: SHIPPED | OUTPATIENT
Start: 2019-04-04 | End: 2019-10-01

## 2019-04-04 RX ORDER — MIRTAZAPINE 30 MG/1
30 TABLET, FILM COATED ORAL
Qty: 30 TAB | Refills: 2 | Status: SHIPPED | OUTPATIENT
Start: 2019-04-04 | End: 2019-05-28 | Stop reason: SDUPTHER

## 2019-04-04 RX ORDER — ATORVASTATIN CALCIUM 40 MG/1
40 TABLET, FILM COATED ORAL
Qty: 90 TAB | Refills: 1 | Status: SHIPPED | OUTPATIENT
Start: 2019-04-04 | End: 2019-10-01

## 2019-04-04 SDOH — SOCIAL STABILITY - SOCIAL INSECURITY: DISSAPEARANCE AND DEATH OF FAMILY MEMBER: Z63.4

## 2019-04-04 NOTE — PROGRESS NOTES
INTERNAL MEDICINE INITIAL PROVIDER VISIT SUBJECTIVE:  
 
Chief Complaint Patient presents with [facility]  Depression HPI: 68 y.o. female with PMHx significant for HTN, ?depression/delirium is here for the above chief complaint(s). Last seen 2017. Comes to clinic with son Radha Knight. Hospital Discharge: Hospitalized [facility] 3/18- outside records reviewed. hospitalized  for altered mental status thought 2/2 depression and dehydration +/- CVA. home health set up, nursing set up on hospital discharge, PT started as well for right knee pain. Depression:   2019, onset afterwards. Has neighbor that help with cooking, cleaning and checking in. Pill box filled by son. Declined inpatient treatment for depression which was recommended by telepsychiatrist. Started on mirtazapine which she feels is helping with depressed mood appetite, sleep. Per son improved outlook since discharge from hospital and start of medicine. One missed medicine dose since hospital discharge. per son not cooking for self, meal prep. Eating well when brought food. Trouble staying asleep. Plans to go to grief support group once a month. Interested in talk therapy. Memory: Per son patient does not have trouble with memory. He reports she has some issues with mumbling sometimes. Forgetting peoples names, forgetting appointment days. Son set up autopay to help with bills. Remembers to take medicine, house locked Right knee pain: request cortisone injections in knee for pain. Current Outpatient Medications Medication Sig  
 mirtazapine (REMERON) 30 mg tablet Take 1 Tab by mouth nightly for 90 days.  amLODIPine (NORVASC) 5 mg tablet Take 1 Tab by mouth nightly for 180 days.  atorvastatin (LIPITOR) 40 mg tablet Take 1 Tab by mouth nightly for 180 days.  aspirin delayed-release 81 mg tablet Take 1 Tab by mouth daily.  timolol (TIMOPTIC) 0.5 % ophthalmic solution Administer 1 Drop to both eyes two (2) times a day.  b complex vitamins tablet Take 1 Tab by mouth daily.  cholecalciferol (VITAMIN D3) 1,000 unit cap Take 5,000 Units by mouth daily. No current facility-administered medications for this visit. Health Maintenance Topic Date Due  
 Hepatitis C Screening  1945  Shingrix Vaccine Age 50> (1 of 2) 11/25/1995  BREAST CANCER SCRN MAMMOGRAM  11/25/1995  GLAUCOMA SCREENING Q2Y  11/25/2010  Bone Densitometry (Dexa) Screening  11/25/2010  Pneumococcal 65+ years (2 of 2 - PPSV23) 11/25/2010  COLONOSCOPY  08/07/2016  MEDICARE YEARLY EXAM  10/29/2018  Influenza Age 5 to Adult  08/01/2019  
 DTaP/Tdap/Td series (2 - Td) 11/22/2027 Medications and Allergies: Reviewed and confirmed in the chart Past Medical Hx: Reviewed and confirmed in the chart Past Medical History:  
Diagnosis Date  Bronchitis  Dry hair  Glaucoma  Hemorrhoids  Hypertension  Hypothyroidism  Obesity (BMI 30.0-34. 9)  Osteoarthritis RIGHT KNEE  
 Stroke [facility]) 1994 DIZZINESS Patient Active Problem List  
Diagnosis Code  Primary osteoarthritis of right knee M17.11  
 History of stroke Z86.73  
 Osteoarthritis M19.90  
 Hypertension I10  
 Hypothyroidism E03.9  Hemorrhoids K64.9  
 Glaucoma H40.9  Obesity (BMI 30.0-34. 9) E66.9  Adenomatous polyp of ascending colon D12.2  Female bladder prolapse N81.10  Prediabetes R73.03  
 HLD (hyperlipidemia) E78.5  Mixed incontinence N39.46  
 SIRS (systemic inflammatory response syndrome) (HCC) R65.10  Memory deficit R41.3  Bereavement Z63.4  Current moderate episode of major depressive disorder without prior episode (HCC) F32.1 Family Hx, Surgical Hx, Social Hx: Reviewed and updated in EMR OBJECTIVE: 
Vitals:  
 04/04/19 1054 04/04/19 1213 BP: (!) 129/92 132/76 Pulse: 76 77 Resp: 16 Temp: 97.4 °F (36.3 °C) TempSrc: Oral   
SpO2: 96% Weight: 174 lb (78.9 kg) Height: 5' 3\" (1.6 m) BP Readings from Last 3 Encounters:  
04/04/19 132/76  
03/21/19 157/82  
08/07/18 (!) 176/106 Wt Readings from Last 3 Encounters:  
04/04/19 174 lb (78.9 kg) 03/18/19 173 lb (78.5 kg) 03/19/19 173 lb (78.5 kg) General: Pleasant elderly woman in no acute distress HEENT: Head is atraumatic normo-cephalic. Neck: Supple, no LAD, no palpable thyromegaly. No carotid bruits. CVS: Heart regular, rate, and rhythm. Audible S1 and S2. PULM: Lungs clear to auscultation bilaterally. No wheezes, rales or rhonchi. MSK: + swelling right knee, + TTP, mild warmth, no erythema of fluctuance. decreased ROM of knee. + crepitus. No joint instability, negative to valgus/varus stress negative anterior/posterior drawer testing. EXT: 2+ dorsalis pedis pulses bilaterally. No pedal edema bilaterally Neuro: Alert and oriented x3. Gait steady. .  
MSE: mood dysphoric, affect congruent and reactive. No SI/HI. Some PMR. TP linear goal directed. Fair insight and judgment. PHQ-9: 13, somewhat difficult ALEX-8: 4, somewhat difficult RESULTS: 
Lab Results Component Value Date/Time WBC 13.7 (H) 03/20/2019 05:25 AM  
 HGB 14.3 03/20/2019 05:25 AM  
 HCT 43.9 03/20/2019 05:25 AM  
 PLATELET 413 09/58/5028 05:25 AM  
 
Lab Results Component Value Date/Time Sodium 142 03/20/2019 05:25 AM  
 Potassium 3.5 03/21/2019 10:30 AM  
 Chloride 109 (H) 03/20/2019 05:25 AM  
 CO2 25 03/20/2019 05:25 AM  
 Glucose 86 03/20/2019 05:25 AM  
 BUN 19 03/20/2019 05:25 AM  
 Creatinine 0.7 03/20/2019 05:25 AM  
 
Lab Results Component Value Date/Time Cholesterol, total 175 03/21/2019 10:30 AM  
 HDL Cholesterol 35 (L) 03/21/2019 10:30 AM  
 LDL, calculated 106 03/21/2019 10:30 AM  
 Triglyceride 169 (H) 03/21/2019 10:30 AM  
 
No results found for: GPT Nursing Notes Reviewed ASSESSMENT AND PLAN 
 ICD-10-CM ICD-9-CM 1. Current moderate episode of major depressive disorder without prior episode (HCC) F32.1 296.22 mirtazapine (REMERON) 30 mg tablet Increase mirtazapine to 30 mg daily Try to eat three healthy meals a day Continue bereavement group Given report to hospital precautions REFERRAL TO PSYCHOLOGY [address] [place] [address] 2. Hypertension, unspecified type I10 401.9 amLODIPine (NORVASC) 5 mg tablet 3. Mixed hyperlipidemia E78.2 272.2 atorvastatin (LIPITOR) 40 mg tablet 4. Bereavement Z63.4 V62.82 mirtazapine (REMERON) 30 mg tablet REFERRAL TO PSYCHOLOGY [address] [place] [address] 5. Memory deficit ? Pseudodementia vs dementia R41.3 780.93 REFERRAL TO NEUROLOGY [address] [place] [address] 6. Osteoarthritis of right knee, unspecified osteoarthritis type M17.11 715.96 REFERRAL TO PHYSICIAL MEDICINE REHAB for knee injections 7. History of stroke Z86.73 V12.54 REFERRAL TO NEUROLOGY Orders Placed This Encounter  REFERRAL TO PHYSICIAL MEDICINE REHAB  REFERRAL TO NEUROLOGY  REFERRAL TO [address]  mirtazapine (REMERON) 30 mg tablet  amLODIPine (NORVASC) 5 mg tablet  atorvastatin (LIPITOR) 40 mg tablet Future Appointments Date Time Provider Kameron Short 5/2/2019 10:30 AM Brayan Pillai MD [address] [place] [address] printed and provided to patient Assessment and plan above discussed with patient, patient voiced understanding and agreement with plan. More than 50% of this 40 min visit was spent face to face counseling the patient about the etiology and treatment options for the above health conditions outlined in assessment and plan Joesph Scales M.D. [address], [address] [place], [address] [place] [address] - [phone] Fax - [phone] or [phone]

## 2019-04-04 NOTE — PROGRESS NOTES
Patient is here to establish care with new pcp. Patient does not have a pcp and went through [place] [facility]. Patient needs to see an orthopedic for a knee replacement.  passed in January and is dealing with depression. Depression and anxiety questionnaires given. 1. Have you been to the ER, urgent care clinic since your last visit? Hospitalized since your last visit? Yes [facility] 2. Have you seen or consulted any other health care providers outside of the [address] [place] [address] since your last visit? Include any pap smears or colon screening.  no

## 2019-04-04 NOTE — PATIENT INSTRUCTIONS
Increase mirtazapine to 30 mg daily Try to eat three healthy meals a day We are sending a referral to grief counselor and neurology. You should be called about this in 3-4 weeks. If no word in 4 weeks please give us a call to follow up We are sending a referral to physical medicine and rehab for knee injections. You should be called about this in 3-4 weeks. If no word in 4 weeks please give us a call to follow up Please call us with name of home health organization and contact number Return in 4 weeks for follow up depression Mirtazapine (By mouth) Mirtazapine ([id number]) Treats depression. Brand Name(s): Remeron, Remeron Soltab There may be other brand names for this medicine. When This Medicine Should Not Be Used: This medicine is not right for everyone. Do not use it if you had an allergic reaction to mirtazapine. How to Use This Medicine:  
Tablet, Dissolving Tablet · Take your medicine as directed. Your dose may need to be changed several times to find what works best for you. Your doctor may tell you to take this medicine at bedtime, because it can make you sleepy. · You may need to take this medicine for several weeks before you begin to feel better. · Make sure your hands are dry before you handle the disintegrating tablet. Peel back the foil from the blister pack, then remove the tablet. Do not push the tablet through the foil. Place the tablet in your mouth. After it has melted, swallow or take a drink of water. Do not crush, split, or break the tablet. · This medicine should come with a Medication Guide. Ask your pharmacist for a copy if you do not have one. · Missed dose: Take a dose as soon as you remember. If it is almost time for your next dose, wait until then and take a regular dose. Do not take extra medicine to make up for a missed dose.  
· Store the medicine in a closed container at room temperature, away from heat, moisture, and direct light. Keep the orally disintegrating tablet in the original package until you are ready to take it. Drugs and Foods to Avoid: Ask your doctor or pharmacist before using any other medicine, including over-the-counter medicines, vitamins, and herbal products. · Do not use this medicine and an MAO inhibitor within 14 days of each other. · Some medicines can affect how mirtazapine works. Tell your doctor if you are using any of the following: 
¨ Buspirone, carbamazepine, cimetidine, diazepam, fentanyl, lithium, nefazodone, phenytoin, rifampicin, Eli's wort, tramadol, or tryptophan ¨ Other medicine to treat depression, a triptan medicine to treat migraine headaches, medicine to treat an infection, medicine to treat HIV, or a blood thinner (such as warfarin) · Do not drink alcohol while you are using this medicine. Warnings While Using This Medicine: · Tell your doctor if you are pregnant or breastfeeding, or if you have kidney disease, liver disease, glaucoma, high cholesterol, heart or blood vessel disease, or a history of seizures, heart attack, or stroke. Tell your doctor if you have phenylketonuria. · For some children, teenagers, and young adults, this medicine may increase mental or emotional problems. This may lead to thoughts of suicide and violence. Talk with your doctor right away if you have any thoughts or behavior changes that concern you. Tell your doctor if you or anyone in your family has a history of bipolar disorder or suicide attempts. · This medicine may cause the following problems: 
¨ Serotonin syndrome (may be life-threatening) ¨ Decreased white blood cells, which can affect your body's ability to fight an infection ¨ Low sodium levels in the blood · Do not stop using this medicine suddenly. Your doctor will need to slowly decrease your dose before you stop it completely. · This medicine may make you dizzy or drowsy.  Do not drive or do anything else that could be dangerous until you know how this medicine affects you. Stand or sit up slowly if you feel lightheaded or dizzy. · Your doctor will do lab tests at regular visits to check on the effects of this medicine. Keep all appointments. · Keep all medicine out of the reach of children. Never share your medicine with anyone. Possible Side Effects While Using This Medicine:  
Call your doctor right away if you notice any of these side effects: · Allergic reaction: Itching or hives, swelling in your face or hands, swelling or tingling in your mouth or throat, chest tightness, trouble breathing · Anxiety, restlessness, fast heartbeat, fever, sweating, muscle spasms, nausea, vomiting, diarrhea, seeing or hearing things that are not there · Blistering, peeling, red skin rash · Eye pain, vision changes, seeing halos around lights · Confusion, weakness, muscle twitching · Feeling more excited or energetic than usual 
· Fever, chills, cough, sore throat, body aches · Thoughts of hurting yourself or others, worsening depression, unusual behaviors If you notice these less serious side effects, talk with your doctor: · Dry mouth, constipation · Increased appetite or weight gain · Sleepiness, tiredness If you notice other side effects that you think are caused by this medicine, tell your doctor. Call your doctor for medical advice about side effects. You may report side effects to FDA at 8-497-FDA-1157 © 2017 [address] Information is for End User's use only and may not be sold, redistributed or otherwise used for commercial purposes. The above information is an  only. It is not intended as medical advice for individual conditions or treatments. Talk to your doctor, nurse or pharmacist before following any medical regimen to see if it is safe and effective for you.

## 2019-05-01 ENCOUNTER — TELEPHONE (OUTPATIENT)
Dept: FAMILY MEDICINE CLINIC | Age: 74
End: 2019-05-01

## 2019-05-01 NOTE — TELEPHONE ENCOUNTER
Pt's son [facility] pt will going to [facility] orthopedic on [facility]. [facility] pt was a previous pt there for shoulder. [facility] please fax info to [place] jg [phone].   [facility] phone # is [phone]

## 2019-05-28 ENCOUNTER — OFFICE VISIT (OUTPATIENT)
Dept: FAMILY MEDICINE CLINIC | Age: 74
End: 2019-05-28

## 2019-05-28 VITALS
HEIGHT: 63 IN | BODY MASS INDEX: 30.82 KG/M2 | OXYGEN SATURATION: 97 % | SYSTOLIC BLOOD PRESSURE: 132 MMHG | DIASTOLIC BLOOD PRESSURE: 82 MMHG | HEART RATE: 81 BPM | TEMPERATURE: 95.7 F | RESPIRATION RATE: 17 BRPM

## 2019-05-28 DIAGNOSIS — Z63.4 BEREAVEMENT: Primary | ICD-10-CM

## 2019-05-28 DIAGNOSIS — F32.0 CURRENT MILD EPISODE OF MAJOR DEPRESSIVE DISORDER WITHOUT PRIOR EPISODE (HCC): ICD-10-CM

## 2019-05-28 DIAGNOSIS — Z91.81 STATUS POST FALL: ICD-10-CM

## 2019-05-28 RX ORDER — MIRTAZAPINE 30 MG/1
30 TABLET, FILM COATED ORAL
Qty: 90 TAB | Refills: 1 | Status: SHIPPED | OUTPATIENT
Start: 2019-05-28 | End: 2019-11-24

## 2019-05-28 SDOH — SOCIAL STABILITY - SOCIAL INSECURITY: DISSAPEARANCE AND DEATH OF FAMILY MEMBER: Z63.4

## 2019-05-28 NOTE — PROGRESS NOTES
Internal Medicine Follow Up Visit Note    Chief Complaint   Patient presents with    Anxiety     follow up    Depression     follow up       HPI:  Maximus Coley is a 68 y.o.  female with history significant for cognitive impairment, bereavement, HTN, HLD is here for the above complaint(s). Comes to clinic with son. Anxiety and depression: since last visit mood is \"better\", feeling \"alright\" today. Feels \"I don't seem to be as depressed as I was. \" Not going to bereavement group. Did increase mirtazapine to 30 mg daily with good impact on mood No side effects. Eating 2 meals per day, Has not schedule with therapist referred after last visit. Per son, noticed positive change in mood, eating more. Improved conversation engagement. Knee Pain: has appointment with pain mgmt scheduled for today. Memory: June 7, 2019 appointment with neurology. Difficulty with filling pill box once a week. Fall: [place] forward when not using cane and answered door. No head pain. No LOC. Positive bursing on top lip improving but continues to hurt \"a little bit. \"     Discussed [facility] leaving practice July 2019. Current Outpatient Medications   Medication Sig    mirtazapine (REMERON) 30 mg tablet Take 1 Tab by mouth nightly for 180 days.  amLODIPine (NORVASC) 5 mg tablet Take 1 Tab by mouth nightly for 180 days.  atorvastatin (LIPITOR) 40 mg tablet Take 1 Tab by mouth nightly for 180 days.  aspirin delayed-release 81 mg tablet Take 1 Tab by mouth daily.  timolol (TIMOPTIC) 0.5 % ophthalmic solution Administer 1 Drop to both eyes two (2) times a day.  cholecalciferol (VITAMIN D3) 1,000 unit cap Take 5,000 Units by mouth daily.  b complex vitamins tablet Take 1 Tab by mouth daily. No current facility-administered medications for this visit.       Health Maintenance   Topic Date Due    Hepatitis C Screening  1945    Shingrix Vaccine Age 50> (1 of 2) 11/25/1995    BREAST CANCER SCRN MAMMOGRAM  11/25/1995    GLAUCOMA SCREENING Q2Y  11/25/2010    Bone Densitometry (Dexa) Screening  11/25/2010    Pneumococcal 65+ years (2 of 2 - PPSV23) 11/25/2010    COLONOSCOPY  08/07/2016    MEDICARE YEARLY EXAM  10/29/2018    Influenza Age 9 to Adult  08/01/2019    DTaP/Tdap/Td series (2 - Td) 11/22/2027       There is no immunization history on file for this patient. Allergies and Medications: Reviewed and updated in EMR. Patient Active Problem List   Diagnosis Code    Primary osteoarthritis of right knee M17.11    History of stroke Z86.73    Osteoarthritis M19.90    Hypertension I10    Hypothyroidism E03.9    Hemorrhoids K64.9    Glaucoma H40.9    Obesity (BMI 30.0-34. 9) E66.9    Adenomatous polyp of ascending colon D12.2    Female bladder prolapse N81.10    Prediabetes R73.03    HLD (hyperlipidemia) E78.5    Mixed incontinence N39.46    SIRS (systemic inflammatory response syndrome) (HCC) R65.10    Memory deficit R41.3    Bereavement Z63.4    Current moderate episode of major depressive disorder without prior episode (HCC) F32.1    Current mild episode of major depressive disorder without prior episode ([facility] [address] F32.0       Family History, Social History, Past Medical History, Surgical History: Reviewed and updated in EMR as appropriate. OBJECTIVE:   Visit Vitals  /82   Pulse 81   Temp 95.7 °F (35.4 °C) (Oral)   Resp 17   Ht 5' 3\" (1.6 m)   LMP  (LMP Unknown)   SpO2 97%   BMI 30.82 kg/m²        BP Readings from Last 3 Encounters:   05/28/19 132/82   04/04/19 132/76   03/21/19 157/82     Wt Readings from Last 3 Encounters:   04/04/19 174 lb (78.9 kg)   03/18/19 173 lb (78.5 kg)   03/19/19 173 lb (78.5 kg)     bruising maxillary lip    General: Pleasant elderly woman in no acute distress  HEENT: Head is normo-cephalic bruising upper lip with mucosal injuring, healing, slight TTP. No erythema, drainage fluctuance. some swelling. Per patient improving.   Neck: Supple, no LAD  Neuro: Alert and oriented x3. Gait steady. .   MSE: mood dysphoric, affect congruent and reactive. No SI/HI. Some PMR. TP linear goal directed. Fair insight and judgment.      4/4/2019 PHQ-9: 13, somewhat difficult  4/4/2019 ALEX-8: 4, somewhat difficult    5/28/2019 PHQ-9: 2, not difficult at all  5/28/2019 ALEX-7: 4, not difficult at all      Nursing Notes Reviewed. LABS/RADIOLOGICAL TESTS:    Lab Results   Component Value Date/Time    WBC 13.7 (H) 03/20/2019 05:25 AM    HGB 14.3 03/20/2019 05:25 AM    HCT 43.9 03/20/2019 05:25 AM    PLATELET 269 67/28/8104 05:25 AM     Lab Results   Component Value Date/Time    Sodium 142 03/20/2019 05:25 AM    Potassium 3.5 03/21/2019 10:30 AM    Chloride 109 (H) 03/20/2019 05:25 AM    CO2 25 03/20/2019 05:25 AM    Glucose 86 03/20/2019 05:25 AM    BUN 19 03/20/2019 05:25 AM    Creatinine 0.7 03/20/2019 05:25 AM     Lab Results   Component Value Date/Time    Cholesterol, total 175 03/21/2019 10:30 AM    HDL Cholesterol 35 (L) 03/21/2019 10:30 AM    LDL, calculated 106 03/21/2019 10:30 AM    Triglyceride 169 (H) 03/21/2019 10:30 AM     No results found for: GPT    All lab results and radiological studies were reviewed and discussed with the patient. ASSESSMENT/PLAN:      ICD-10-CM ICD-9-CM    1. Bereavement Z63.4 V62.82 mirtazapine (REMERON) 30 mg tablet   2. Current mild episode of major depressive disorder without prior episode (HCC) F32.0 296.21 mirtazapine (REMERON) 30 mg tablet  Given number for talk therapy, recommneded talk therapy and/or bereavement group  Healthy diet and exercise encouraged   3. Status post fall Z91.81 V15.88 CT HEAD WO CONT  Encouraged to use cane at all times       Requested Prescriptions     Signed Prescriptions Disp Refills    mirtazapine (REMERON) 30 mg tablet 90 Tab 1     Sig: Take 1 Tab by mouth nightly for 180 days. Patient verbalized understanding and agreement with the plan.     Patient was given an after-visit summary. Follow-up and Dispositions    · Return in about 2 months (around 7/28/2019) for new PCP, anxiety/depression. or sooner if worsening symptoms. More than 50% of this 25 min visit was spent counseling the patient face to face about etiology and treatment of health conditions outlined in assessment and plan. Brandon Lino M.D.   [place] [phone]  [place] [phone]  [phone]

## 2019-05-28 NOTE — PROGRESS NOTES
Mary Knox is a 68 y.o. female presents in office for    Chief Complaint   Patient presents with    Anxiety     follow up    Depression     follow up        Health Maintenance Due   Topic Date Due    Hepatitis C Screening  1945    Shingrix Vaccine Age 50> (1 of 2) 11/25/1995    BREAST CANCER SCRN MAMMOGRAM  11/25/1995    GLAUCOMA SCREENING Q2Y  11/25/2010    Bone Densitometry (Dexa) Screening  11/25/2010    Pneumococcal 65+ years (2 of 2 - PPSV23) 11/25/2010    COLONOSCOPY  08/07/2016    MEDICARE YEARLY EXAM  10/29/2018       1. Have you been to the ER, urgent care clinic since your last visit? Hospitalized since your last visit? No    2. Have you seen or consulted any other health care providers outside of the [address] [place] [address] since your last visit? Include any pap smears or colon screening.  No     Learning Assessment 11/22/2017   PRIMARY LEARNER Patient   HIGHEST LEVEL OF EDUCATION - PRIMARY LEARNER  SOME COLLEGE   BARRIERS PRIMARY LEARNER NONE   CO-LEARNER CAREGIVER No   PRIMARY LANGUAGE ENGLISH   LEARNER PREFERENCE PRIMARY READING   ANSWERED BY patient   RELATIONSHIP SELF

## 2019-05-28 NOTE — PATIENT INSTRUCTIONS
PLEASE USE PILL BOX TO HELP WITH MEDICATIONS, FILL BOX ONCE A WEEK TO HELP WITH SAFELY TAKING MEDICATIONS Follow up with talk therapist below [facility] [address] [place], [address] Phone: [phone] Fax: [phone] BRING COMPLETED ADVANCED CARE PLANNING FORM BACK WHEN COMPLETED Grief (Actual/Anticipated): Care Instructions Your Care Instructions Grief is your emotional reaction to a major loss. The words \"sorrow\" and \"heartache\" often are used to describe feelings of grief. You feel grief when you lose a beloved person, pet, place, or thing. It is also natural to feel grief when you lose a valued way of life, such as a job, marriage, or good health. You may begin to grieve before a loss occurs. You may grieve for a loved one who is sick and dying. Children and adults often feel the pain of loss before a big move or divorce. This type of grief helps you get ready for a loss. Grief is different for each person. There is no \"normal\" or \"expected\" period of time for grieving. Some people adjust to their loss within a couple of months. Others may take 2 years or longer, especially if their lives were changed a lot or if the loss was sudden and shocking. Grieving can cause problems such as headaches, loss of appetite, and trouble with thinking or sleeping. You may withdraw from friends and family and behave in ways that are unusual for you. Grief may cause you to question your beliefs and views about life. Grief is natural and does not require medical treatment. But if you have trouble sleeping, it may help to take sleeping pills for a short time. It may help to talk with people who have been through or are going through similar losses. You may also want to talk to a counselor about your feelings. Talking about your loss, sharing your cares and concerns, and getting support from others are important parts of healthy grieving. Follow-up care is a key part of your treatment and safety. Be sure to make and go to all appointments, and call your doctor if you are having problems. It's also a good idea to know your test results and keep a list of the medicines you take. How can you care for yourself at home? · Get enough sleep. Your mind helps make sense of your life while you sleep. Missing sleep can lead to illness and make it harder for you to deal with your grief. · Eat healthy foods. Try to avoid eating only foods that give you comfort. Ask someone to join you for a meal if you do not like eating alone. Consider taking a multivitamin every day. · Get some exercise every day. Even a walk can help you deal with your grief. Other exercises, such as yoga, can also help you manage stress. · Comfort yourself. Take time to look at photos or use special items that make you feel better. · Stay involved in your life. Do not withdraw from the activities you enjoy. People you know at work, [employer], clubs, or other groups can help you get through your period of grief. · Think about joining a support group to help you deal with your grief. There are many support groups to help people recover from grief. When should you call for help? Call 911 anytime you think you may need emergency care. For example, call if: 
  · You feel you cannot stop from hurting yourself or someone else.  
 Watch closely for changes in your health, and be sure to contact your doctor if: 
  · You think you may be depressed.  
  · You do not get better as expected. Where can you learn more? Go to http://john-gi.info/. Enter H249 in the search box to learn more about \"Grief (Actual/Anticipated): Care Instructions. \" Current as of: April 18, 2018 Content Version: 11.9 © 3279-0110 [employer].  Care instructions adapted under license by [employer] (which disclaims liability or warranty for this information). If you have questions about a medical condition or this instruction, always ask your healthcare professional. [place] [address] any warranty or liability for your use of this information.

## 2019-05-28 NOTE — ACP (ADVANCE CARE PLANNING)
Advance Care Planning (ACP) Provider Note - Comprehensive     Date of ACP Conversation: 05/28/19  Persons included in Conversation:  patient and family  Length of ACP Conversation in minutes:  <16 minutes (Non-Billable)    Authorized Decision Maker (if patient is incapable of making informed decisions): This person is:  N/A            General ACP for ALL Patients with Decision Making Capacity:   Importance of advance care planning, including choosing a healthcare agent to communicate patient's healthcare decisions if patient lost the ability to make decisions, such as after a sudden illness or accident    Review of Existing Advance Directive:  N/A    For Serious or Chronic Illness:  Understanding of medical condition    Understanding of CPR, goals and expected outcomes, benefits and burdens discussed.     Interventions Provided:  Recommended completion of Advance Directive form after review of ACP materials and conversation with prospective healthcare agent

## 2019-06-07 ENCOUNTER — OFFICE VISIT (OUTPATIENT)
Dept: NEUROLOGY | Age: 74
End: 2019-06-07

## 2019-06-07 VITALS
HEIGHT: 63 IN | SYSTOLIC BLOOD PRESSURE: 124 MMHG | RESPIRATION RATE: 20 BRPM | BODY MASS INDEX: 25.48 KG/M2 | WEIGHT: 143.8 LBS | DIASTOLIC BLOOD PRESSURE: 90 MMHG | HEART RATE: 97 BPM | TEMPERATURE: 98.2 F | OXYGEN SATURATION: 97 %

## 2019-06-07 DIAGNOSIS — F32.9 REACTIVE DEPRESSION: ICD-10-CM

## 2019-06-07 DIAGNOSIS — R41.3 MEMORY DEFICIT: Primary | ICD-10-CM

## 2019-06-07 DIAGNOSIS — I69.319 CVA, OLD, COGNITIVE DEFICITS: ICD-10-CM

## 2019-06-07 RX ORDER — ASPIRIN 325 MG
325 TABLET, DELAYED RELEASE (ENTERIC COATED) ORAL DAILY
Qty: 30 TAB | Refills: 11
Start: 2019-06-07 | End: 2020-08-13 | Stop reason: ALTCHOICE

## 2019-06-07 NOTE — PROGRESS NOTES
Morris Mcdaniels is a 68 y.o. female new patient in today to discuss h/o CVA and memory deficits; referred by Marquis Penny MD.    Learning assessment previously completed 11/22/2017; primary language is [place].

## 2019-06-07 NOTE — LETTER
6/7/19 Patient: Ronald Dexter YOB: 1945 Date of Visit: 6/7/2019 Brandon Lino MD 
[address] [place] [address] [address] [facility] [address] VIA In Basket Dear Brandon Lino MD, Thank you for referring Ms. Ronald Dexter to [facility] for evaluation. My notes for this consultation are attached. If you have questions, please do not hesitate to call me. I look forward to following your patient along with you.  
 
 
Sincerely, 
 
Alessandra Baker MD

## 2019-06-07 NOTE — PROGRESS NOTES
Ivett Batista is a 68 y.o., right handed female, with an estabblished history of hypertension, stroke back in , who comes in for evaluation of her old stroke and memory lose. Her  of many years passed away in January of this year. She was significantly depressed and subsequently was hospitalized in March. She was found to have some new strokes on MRI imaging. She was also noted to have signs of depression, dehydration and urinary tract infection. Since her hospital discharge she has improved dramatically once placed on the anti-depressant. What is new is she seems to have some memory issues. She's very forgetful and needs reminding on occasions. She's still driving and has not been getting lost.  She's actually quite good with directions. There's no weakness in her arms and legs. No vision changes, no language difficulty. She did have some slurring of her words at onset, but that has improved. Social History; she's a , recently. Was  for 47 years, now living alone. She doesn't smoke, drink or use illicit drugs. Worked sales, now retired. Family History; mother  from cancer, had hypertension. Father had ALS. Siblings  from leukemia. Current Outpatient Medications   Medication Sig Dispense Refill    amLODIPine (NORVASC) 5 mg tablet Take 1 Tab by mouth nightly for 180 days. 90 Tab 1    aspirin delayed-release 81 mg tablet Take 1 Tab by mouth daily. 100 Tab 3    mirtazapine (REMERON) 30 mg tablet Take 1 Tab by mouth nightly for 180 days. 90 Tab 1    atorvastatin (LIPITOR) 40 mg tablet Take 1 Tab by mouth nightly for 180 days. 90 Tab 1    timolol (TIMOPTIC) 0.5 % ophthalmic solution Administer 1 Drop to both eyes two (2) times a day.  cholecalciferol (VITAMIN D3) 1,000 unit cap Take 5,000 Units by mouth daily.  b complex vitamins tablet Take 1 Tab by mouth daily.          Past Medical History:   Diagnosis Date    Bronchitis     Dry hair     Glaucoma     Hemorrhoids     Hypertension     Hypothyroidism     Obesity (BMI 30.0-34. 9)     Osteoarthritis     RIGHT KNEE    Stroke ([facility] [address] 1994    DIZZINESS        Past Surgical History:   Procedure Laterality Date    HX APPENDECTOMY      HX HEENT      jaw surgery    HX HYSTERECTOMY         Allergies   Allergen Reactions    Losartan Other (comments)     anuria    Antihistamine [Diphenhydramine Hcl] Other (comments)     \"stops bladder\"    Erythromycin Hives    Iodine Other (comments)     Stops bladder    Penicillin G Rash    Sulfa (Sulfonamide Antibiotics) Other (comments) and Unknown (comments)    Sulfur Other (comments)     \"stops bladder\"       Patient Active Problem List   Diagnosis Code    Primary osteoarthritis of right knee M17.11    History of stroke Z86.73    Osteoarthritis M19.90    Hypertension I10    Hypothyroidism E03.9    Hemorrhoids K64.9    Glaucoma H40.9    Obesity (BMI 30.0-34. 9) E66.9    Adenomatous polyp of ascending colon D12.2    Female bladder prolapse N81.10    Prediabetes R73.03    HLD (hyperlipidemia) E78.5    Mixed incontinence N39.46    SIRS (systemic inflammatory response syndrome) ([facility]) R65.10    Memory deficit R41.3    Bereavement Z63.4    Current moderate episode of major depressive disorder without prior episode ([facility]) F32.1    Current mild episode of major depressive disorder without prior episode ([facility] [address] F32.0         Review of Systems:   As above otherwise 11 point review of systems negative including;   Constitutional no fever or chills  Skin denies rash or itching  HENT  Denies tinnitus, hearing lose  Eyes denies diplopia vision lose  Respiratory denies shortness of breath  Cardiovascular denies chest pain, dyspnea on exertion  Gastrointestinal denies nausea, vomiting, diarrhea, constipation  Genitourinary denies incontinence  Musculoskeletal denies joint pain or swelling  Endocrine denies weight change  Hematology denies easy bruising or bleeding Neurological as above in HPI      PHYSICAL EXAMINATION:      VITAL SIGNS:    Visit Vitals  /90 (BP 1 Location: Left arm, BP Patient Position: Sitting)   Pulse 97   Temp 98.2 °F (36.8 °C) (Oral)   Resp 20   Ht 5' 3\" (1.6 m)   Wt 65.2 kg (143 lb 12.8 oz)   LMP  (LMP Unknown)   SpO2 97%   BMI 25.47 kg/m²       GENERAL: The patient is well developed, well nourished, and in no apparent distress. EXTREMITIES: No clubbing, cyanosis, or edema is identified. Pulses 2+ and symmetrical.  Muscle tone is normal.  HEAD:   Ear, nose, and throat appear to be without trauma. The patient is normocephalic. NEUROLOGIC EXAMINATION    MENTAL STATUS: The patient is awake, alert, and oriented x 4. Fund of knowledge is adequate. Speech is fluent and memory appears to be poor, getting only 1/3 items and scoring only 26/30 on the MMSE. CRANIAL NERVES: II - Visual fields are full to confrontation. Funduscopic examination reveals flat disks bilaterally. Pupils are both 3 mm and briskly reactive to light and accommodation. III, IV, VI - Extraocular movements are intact and there is no nystagmus. V - Facial sensation is intact to pinprick and light touch. VII - Face is symmetrical.   VIII - Hearing is present. IX, X, XII- Palate rises symmetrically. Gag is present. Tongue is in the midline. XI - Shoulder shrugging and head turning intact  MOTOR:  The patient is 5/5 in all four limbs without any drift. Fine finger movements are symmetrical.  Isolated motor group testing reveals no focal abnormalities. Tone is normal.  Sensory examination is intact to pinprick, light touch and position sense testing. Reflexes are 2+ and symmetrical. Plantars are down going. Cerebellar examination reveals no gross ataxia or dysmetria. Gait is antalgic, limping off of the right knee. Final result (Exam End: 3/19/2019 22:37) Provider Status: Open   Study Result     INDICATION: acute encephalopathy r/o cva.       COMPARISON: Head CT 3/18/2019. TECHNIQUE: Multiplanar, multisequence MRI brain without IV contrast.     FINDINGS:     Small area of elevated DWI signal in the left cerebellar hemisphere with near  complete normalization of signal on the ADC map, consistent with subacute  infarct. Multiple tiny foci of elevated signal in the left thalamus and internal  capsule with decreased signal on the ADC map, consistent with acute infarcts. Old lacunar infarct in the right corona radiata.     Brain volume is within normal limits for age. There are multiple scattered foci  of chronic small vessel ischemic changes in the white matter. These are not  unusual for patient age. There is no brain parenchymal mass, hemorrhage, or  hydrocephalus. Multiple punctate foci susceptibility artifact in the bilateral  basal ganglia, corona radiata, brainstem, and cerebellum, consistent with  hemosiderin staining from remote hypertensive microhemorrhages. The sella,  pineal region, and craniocervical junction are unremarkable. Skull base flow  voids are patent.      Mild mucosal thickening in the ethmoid air cells.     IMPRESSION  IMPRESSION:     1.  Multiple tiny acute lacunar infarcts in the left thalamus and internal  capsule. 2.  Small subacute lacunar infarct in the left cerebellar hemisphere. 3.  Old lacunar infarct in the right corona radiata. 4.  Mild chronic microvascular ischemic changes. 5.  Multiple punctate foci susceptibility artifact, consistent with remote  hypertensive microhemorrhages.            I have reviewed the above imagines myself.        CBC:   Lab Results   Component Value Date/Time    WBC 13.7 (H) 03/20/2019 05:25 AM    RBC 5.04 03/20/2019 05:25 AM    HGB 14.3 03/20/2019 05:25 AM    HCT 43.9 03/20/2019 05:25 AM    PLATELET 360 02/37/0002 05:25 AM     BMP:   Lab Results   Component Value Date/Time    Glucose 86 03/20/2019 05:25 AM    Sodium 142 03/20/2019 05:25 AM    Potassium 3.5 03/21/2019 10:30 AM    Chloride 109 (H) 03/20/2019 05:25 AM    CO2 25 03/20/2019 05:25 AM    BUN 19 03/20/2019 05:25 AM    Creatinine 0.7 03/20/2019 05:25 AM    Calcium 9.2 03/20/2019 05:25 AM     CMP:   Lab Results   Component Value Date/Time    Glucose 86 03/20/2019 05:25 AM    Sodium 142 03/20/2019 05:25 AM    Potassium 3.5 03/21/2019 10:30 AM    Chloride 109 (H) 03/20/2019 05:25 AM    CO2 25 03/20/2019 05:25 AM    BUN 19 03/20/2019 05:25 AM    Creatinine 0.7 03/20/2019 05:25 AM    Calcium 9.2 03/20/2019 05:25 AM    Anion gap 7 03/20/2019 05:25 AM    BUN/Creatinine ratio 27 (H) 11/29/2017 03:27 PM    Alk. phosphatase 82 03/20/2019 05:25 AM    Protein, total 6.6 03/20/2019 05:25 AM    Albumin 2.7 (L) 03/20/2019 05:25 AM    Globulin 3.5 06/09/2010 01:49 PM    A-G Ratio 1.1 06/09/2010 01:49 PM     Coagulation:   Lab Results   Component Value Date/Time    Prothrombin time 13.2 (H) 03/19/2019 06:07 AM    INR 1.1 03/19/2019 06:07 AM     Cardiac markers: No results found for: CPK, CKND1, ERENDIRA     3/20/2019  6:34 AM - Jose Luis, [facility]     [facility] Ref Range Units Status   Hemoglobin A1c 5.8   4.2 - 6.3 % Final   Comment:     3/21/2019 11:53 AM - Jose Luis, [facility]     [facility] Ref Range Units Status   Cholesterol, total 175   140 - 199 mg/dl Final   HDL Cholesterol 35  Low   40 - 96 mg/dl Final   Triglyceride 169  High   29 - 150 mg/dl Final   LDL, calculated 106   0 - 130 mg/dl Final   Comment:   TARGET/DECISION VALUES DEPEND ON A NUMBER OF RISK FACTORS. LDL CALCULATION NOT VALID IF TRIGLYCERIDES ARE GREATER THAN 400 mg/dL. CHOL/HDL Ratio 5.0  High   0.0 - 4.4 Ratio Final         Impression: Very complicated situation of the patient who suffered a stroke cortical in nature, memory issues who has risk factors including stroke, reactive depression as well as possible dementia. An impossible situation to tell exactly what is going on yet since all 3 risk factors may be playing into having her cognition not working well.   She is definitely had a stroke in the left subcortex that could be giving her some language dysfunction. Nothing dramatic is being picked up on examination but this could be playing into her \"memory trouble\". Reactive depression can always need to to a pseudodementia of depression. Also a straightforward dementia could be part of this picture. Plan: I would like to get a CT angiogram to make sure she does not have any large vessel vascular occlusion. That seems to have not been done when she was hospitalized. I advised her to increase her aspirin therapy from 81 mg up to 325 mg a day. She needs to have her blood pressure maintained below 130/80, her LDL cholesterol needs to be kept below 70. I would see her back here in 8 weeks and reevaluate her and determine whether further neuropsychological testing is warranted at that time. I think it is too close to when she was placed on the antidepressants to say that this is not part of her depression and I certainly do not want to put her through about testing at this time. I will see her back here in 8 weeks. Thank you for allowing me to evaluate this patient. PLEASE NOTE:   This document has been produced using voice recognition software. Unrecognized errors in transcription may be present.

## 2019-06-10 ENCOUNTER — TELEPHONE (OUTPATIENT)
Dept: FAMILY MEDICINE CLINIC | Age: 74
End: 2019-06-10

## 2019-06-10 DIAGNOSIS — E78.2 MIXED HYPERLIPIDEMIA: Primary | ICD-10-CM

## 2019-06-10 DIAGNOSIS — Z86.73 HISTORY OF STROKE: ICD-10-CM

## 2019-06-10 NOTE — TELEPHONE ENCOUNTER
LPN please call patient and inform blood work needs to be completed prior to appointment with OWEN Anand, fasting labs including lipids. Labs ordered, can take to [facility] or [facility]. Neurology note reviewed:  Goal BP <130/80 LDL goal <70  lipids to be rechecked, started statin during hospitalization 3/2019. Nigel Smith M.D.   [address], [address] [place] [address], [address] [place] [address]   [facility] -   [phone]

## 2019-06-11 ENCOUNTER — TELEPHONE (OUTPATIENT)
Dept: FAMILY MEDICINE CLINIC | Age: 74
End: 2019-06-11

## 2019-06-11 NOTE — TELEPHONE ENCOUNTER
Lisa linder @ [facility] for CAT scans states not able to do pt's CAT Scan. States please call to discuss ASAP. [phone]. States has tried to contact Dr Chet Patton office multiple times with no success.

## 2019-06-11 NOTE — TELEPHONE ENCOUNTER
Per radiology patient has Iodine allergy, \"stops bladder. \" LPN please call patient and ask to get name of past PCP so we can request allergy history. Please call neurology office and inform alyse camarillo so test needs to be rescheduled. Brain Flores M.D.   [address], [address] [place] [address] [phone]  [facility] [phone]

## 2019-06-14 ENCOUNTER — TELEPHONE (OUTPATIENT)
Dept: NEUROLOGY | Age: 74
End: 2019-06-14

## 2019-06-14 NOTE — TELEPHONE ENCOUNTER
Pt's son was advised to request medication for pt to take prior to CTA procedure since pt is allergic to iodine. Pt requests script to be sent to [facility]. Pt's son states he will reschedule CTA after script is filled.

## 2019-08-07 ENCOUNTER — TELEPHONE (OUTPATIENT)
Dept: NEUROLOGY | Age: 74
End: 2019-08-07

## 2019-08-07 NOTE — TELEPHONE ENCOUNTER
Pt and pt's son were advised to request medication for pt to take prior to CTA procedure since pt is allergic to iodine. Pt's appt for 8/7 was cancelled due to pt not having completed CT. Please advise.     Pt's son: [phone]

## 2019-08-13 ENCOUNTER — OFFICE VISIT (OUTPATIENT)
Dept: FAMILY MEDICINE CLINIC | Age: 74
End: 2019-08-13

## 2019-08-13 DIAGNOSIS — F32.1 CURRENT MODERATE EPISODE OF MAJOR DEPRESSIVE DISORDER WITHOUT PRIOR EPISODE (HCC): ICD-10-CM

## 2019-08-13 DIAGNOSIS — N81.10 BLADDER PROLAPSE, FEMALE, ACQUIRED: ICD-10-CM

## 2019-08-13 DIAGNOSIS — R79.89 ABNORMAL THYROID BLOOD TEST: Primary | ICD-10-CM

## 2019-08-13 DIAGNOSIS — Z13.220 SCREENING, LIPID: ICD-10-CM

## 2019-08-13 DIAGNOSIS — I10 ESSENTIAL HYPERTENSION: ICD-10-CM

## 2019-08-13 DIAGNOSIS — Z86.73 HISTORY OF STROKE: ICD-10-CM

## 2019-08-13 NOTE — PROGRESS NOTES
ESTABLISH CARE VISIT    SUBJECTIVE:     Chief Complaint   Patient presents with    Establish Care       HPI: 68 y.o.  female  has a past medical history of Bronchitis, Dry hair, Glaucoma, Hemorrhoids, Hypertension, Hypothyroidism, Obesity (BMI 30.0-34.9), Osteoarthritis, and Stroke ([facility] [address] (1994). is here for the above chief complaint(s). Patient here today with her son. Patient living alone. Hypertension  Patient is currently taking   Key CAD CHF Meds             aspirin delayed-release 325 mg tablet (Taking) Take 1 Tab by mouth daily. amLODIPine (NORVASC) 5 mg tablet (Taking) Take 1 Tab by mouth nightly for 180 days. atorvastatin (LIPITOR) 40 mg tablet (Taking) Take 1 Tab by mouth nightly for 180 days. . BP today is    BP Readings from Last 1 Encounters:   08/13/19 (!) 163/100     Patient has been taking medications as prescribed. Patient is checking BP at home. Patient is averaging 130's-140's/70's. Patient does follow low salt diet. Patient is currently exercising to include . Patient denies chest pain, shortness of breath, palpitations, lower extremity edema, dizziness/lightheadedness or syncopal episodes. Depression/Memory Changes  Patient saw neurology and was noted to have a mini stroke. Patient was also scheduled to have carotid artery studies. Patient was found to have allergy to iodine, has to go back to get the carotid scan done. Follow-up with neurology is in a few weeks. Patient also having memory changes, unclear if this is related to stroke or depression. Patient still driving, not driving at night. Memory changes seem to be more shot term memories. Neurologist didn't see any indication of dementia. Patient states that depression has improved. Son states that she is laughing more, seems happier. Key Psychotherapeutic Meds             mirtazapine (REMERON) 30 mg tablet (Taking) Take 1 Tab by mouth nightly for 180 days.       Patient has pill box that she puts in her box once a week. Patient's son states that she is taking her medications as prescribed. Health Maintenance:    Eye -  no complaints, last eye exam: 11/2018  Oral Health -  no complaints, last exam:   Hearing -  no complaints,   U/A -  no UTI sxs. Bladder prolapse, urinary frequency  Cardiac- denies history, denies chest pain  Colonoscopy - no colon cancer in the family, unclear when she is due      Review of Systems   Constitutional: Negative for chills, fever, malaise/fatigue and weight loss. Eyes: Negative for blurred vision, double vision and pain. Respiratory: Negative for cough, sputum production, shortness of breath and wheezing. Cardiovascular: Negative for chest pain, palpitations, orthopnea, claudication and leg swelling. Gastrointestinal: Negative for abdominal pain, constipation, diarrhea, nausea and vomiting. Genitourinary: Negative for dysuria, frequency and urgency. Neurological: Negative for dizziness, tingling and headaches. Psychiatric/Behavioral: Positive for depression and memory loss. Negative for hallucinations, substance abuse and suicidal ideas. The patient is not nervous/anxious and does not have insomnia. @[facility]@  Current Outpatient Medications   Medication Sig    aspirin delayed-release 325 mg tablet Take 1 Tab by mouth daily.  mirtazapine (REMERON) 30 mg tablet Take 1 Tab by mouth nightly for 180 days.  amLODIPine (NORVASC) 5 mg tablet Take 1 Tab by mouth nightly for 180 days.  atorvastatin (LIPITOR) 40 mg tablet Take 1 Tab by mouth nightly for 180 days.  timolol (TIMOPTIC) 0.5 % ophthalmic solution Administer 1 Drop to both eyes two (2) times a day.  b complex vitamins tablet Take 1 Tab by mouth daily.  cholecalciferol (VITAMIN D3) 1,000 unit cap Take 5,000 Units by mouth daily. No current facility-administered medications for this visit.       Health Maintenance   Topic Date Due    Hepatitis C Screening  1945    Shingrix Vaccine Age 49> (1 of 2) 11/25/1995    BREAST CANCER SCRN MAMMOGRAM  11/25/1995    GLAUCOMA SCREENING Q2Y  11/25/2010    Bone Densitometry (Dexa) Screening  11/25/2010    Pneumococcal 65+ years (2 of 2 - PPSV23) 11/25/2011    COLONOSCOPY  08/07/2016    MEDICARE YEARLY EXAM  06/13/2019    Influenza Age 9 to Adult  08/01/2019    DTaP/Tdap/Td series (2 - Td) 11/22/2027       Medications and Allergies: Reviewed and confirmed in the chart    Past Medical Hx: Reviewed and confirmed in the chart  Past Medical History:   Diagnosis Date    Bronchitis     Dry hair     Glaucoma     Hemorrhoids     Hypertension     Hypothyroidism     Obesity (BMI 30.0-34. 9)     Osteoarthritis     RIGHT KNEE    Stroke [facility]) 1994    DIZZINESS        Patient Active Problem List   Diagnosis Code    Primary osteoarthritis of right knee M17.11    History of stroke Z86.73    Osteoarthritis M19.90    Hypertension goal BP (blood pressure) < 130/80 I10    Hypothyroidism E03.9    Hemorrhoids K64.9    Glaucoma H40.9    Obesity (BMI 30.0-34. 9) E66.9    Adenomatous polyp of ascending colon D12.2    Female bladder prolapse N81.10    Prediabetes R73.03    HLD (hyperlipidemia) E78.5    Mixed incontinence N39.46    SIRS (systemic inflammatory response syndrome) (HCC) R65.10    Memory deficit R41.3    Bereavement Z63.4    Current moderate episode of major depressive disorder without prior episode (HCC) F32.1    Current mild episode of major depressive disorder without prior episode ([address] F32.0       Family Hx, Surgical Hx, Social Hx: Reviewed and updated in EMR    OBJECTIVE:  Vitals:    08/13/19 1437   BP: (!) 163/100   Pulse: 80   Resp: 18   Temp: 96.5 °F (35.8 °C)   TempSrc: Oral   SpO2: 97%   Weight: 143 lb (64.9 kg)   Height: 5' 3\" (1.6 m)       BP Readings from Last 3 Encounters:   08/13/19 (!) 163/100   06/07/19 124/90   05/28/19 132/82     Wt Readings from Last 3 Encounters:   08/13/19 143 lb (64.9 kg)   06/07/19 143 lb 12.8 oz (65.2 kg)   04/04/19 174 lb (78.9 kg)       Physical Exam   Constitutional: She is oriented to person, place, and time and well-developed, well-nourished, and in no distress. No distress. Neck: Normal range of motion. Neck supple. No thyromegaly present. Cardiovascular: Normal rate, regular rhythm, normal heart sounds and intact distal pulses. Exam reveals no gallop and no friction rub. No murmur heard. Pulmonary/Chest: Effort normal and breath sounds normal. No respiratory distress. She has no wheezes. She has no rales. She exhibits no tenderness. Lymphadenopathy:     She has no cervical adenopathy. Neurological: She is alert and oriented to person, place, and time. Skin: Skin is warm and dry. She is not diaphoretic. Vitals reviewed. Lab Results   Component Value Date/Time    WBC 13.7 (H) 03/20/2019 05:25 AM    HGB 14.3 03/20/2019 05:25 AM    HCT 43.9 03/20/2019 05:25 AM    PLATELET 219 18/71/1414 05:25 AM     Lab Results   Component Value Date/Time    Sodium 142 03/20/2019 05:25 AM    Potassium 3.5 03/21/2019 10:30 AM    Chloride 109 (H) 03/20/2019 05:25 AM    CO2 25 03/20/2019 05:25 AM    Glucose 86 03/20/2019 05:25 AM    BUN 19 03/20/2019 05:25 AM    Creatinine 0.7 03/20/2019 05:25 AM     Lab Results   Component Value Date/Time    Cholesterol, total 175 03/21/2019 10:30 AM    HDL Cholesterol 35 (L) 03/21/2019 10:30 AM    LDL, calculated 106 03/21/2019 10:30 AM    Triglyceride 169 (H) 03/21/2019 10:30 AM     No results found for: GPT    Nursing Notes Reviewed    ASSESSMENT AND PLAN  Diagnoses and all orders for this visit:    1. Abnormal thyroid blood test  -     TSH 3RD GENERATION; Future  -     T4, FREE; Future  -     T3 TOTAL; Future    2. Screening, lipid  -     LIPID PANEL; Future    3. Essential hypertension  -     METABOLIC PANEL, COMPREHENSIVE; Future  Continue with   Key CAD CHF Meds             aspirin delayed-release 325 mg tablet (Taking) Take 1 Tab by mouth daily.     amLODIPine (NORVASC) 5 mg tablet (Taking) Take 1 Tab by mouth nightly for 180 days. atorvastatin (LIPITOR) 40 mg tablet (Taking) Take 1 Tab by mouth nightly for 180 days. 1) Goal blood pressure less than equal to 140/90 mmHg, goal BP can vary depending on risk factors as discussed. 2) Lifestyle modifications discussed with patient, low sodium <2 gm, low salt , DASH diet  3) Exercise for at least 30 min 3-5 times a week for goal BMI of less than or equal  To 25.  4) Continue current medications as prescribed. 5) Please begin medication as discussed for better blood pressure control, explained side effects and patient verbalized understanding. 6) Goal LDL<100.  7) Please monitor your blood pressure and keep a log to bring in with you at each visit. 8) Discussed risk factors with patient such as CAD, FAST of stroke symptoms, pt verbalizes understanding. 9) Please avoid smoking , alcohol and any illicit drug use if applicable to you. 10) Discussed lifestyle modifications ,dietary control and BP monitoring at home as patient does not wish to begin an antihypertension agent at present. 4. Urinary frequency  UA - WNL    5. Bladder prolapse, female, acquired     UA- WNL    6. History of stroke   Follow-up with neurology as scheduled. 7. Current moderate episode of major depressive disorder without prior episode ([address]  Continue with  Key Psychotherapeutic Meds             mirtazapine (REMERON) 30 mg tablet (Taking) Take 1 Tab by mouth nightly for 180 days. Follow-up with neuropsychiatry as scheduled. I have discussed the diagnosis with the patient and the intended plan as seen in the above orders. The patient has received an after-visit summary and questions were answered concerning future plans. I have discussed medication side effects and warnings with the patient as well.  I have reviewed the plan of care with the patient, accepted their input and they are in agreement with the treatment goals.    More than 50% of this 30 min visit was spent counseling the patient face to face about etiology and treatment of health conditions outlined in assessment and plan        Jackie Machuca [place] [phone]  [facility] -   [phone]

## 2019-08-13 NOTE — PROGRESS NOTES
Chief Complaint   Patient presents with   [facility] Establish Care     1. Have you been to the ER, urgent care clinic since your last visit? Hospitalized since your last visit? No    2. Have you seen or consulted any other health care providers outside of the [address] [place] [address] since your last visit? Include any pap smears or colon screening.  No

## 2019-08-14 NOTE — TELEPHONE ENCOUNTER
Followed-up with son with reminder to reschedule testing. He is asking for a rx prior to rescheduling. Please advise.

## 2019-08-26 ENCOUNTER — HOSPITAL ENCOUNTER (OUTPATIENT)
Dept: LAB | Age: 74
Discharge: HOME | End: 2019-08-26

## 2019-08-26 ENCOUNTER — HOSPITAL ENCOUNTER (OUTPATIENT)
Dept: LAB | Age: 74
Discharge: HOME | End: 2019-08-26
Payer: MEDICARE

## 2019-08-26 DIAGNOSIS — Z13.220 SCREENING, LIPID: ICD-10-CM

## 2019-08-26 DIAGNOSIS — E07.9 THYROID DISORDER: ICD-10-CM

## 2019-08-26 DIAGNOSIS — I10 ESSENTIAL HYPERTENSION: ICD-10-CM

## 2019-08-26 DIAGNOSIS — N81.10 BLADDER PROLAPSE, FEMALE, ACQUIRED: ICD-10-CM

## 2019-08-26 DIAGNOSIS — R79.89 ABNORMAL THYROID BLOOD TEST: ICD-10-CM

## 2019-08-26 DIAGNOSIS — R35.0 URINARY FREQUENCY: ICD-10-CM

## 2019-08-26 LAB
ALBUMIN SERPL-MCNC: 3.7 G/DL (ref 3.4–5)
ALBUMIN/GLOB SERPL: 1.2 {RATIO} (ref 0.8–1.7)
ALP SERPL-CCNC: 102 U/L (ref 45–117)
ALT SERPL-CCNC: 20 U/L (ref 13–56)
ANION GAP SERPL CALC-SCNC: 6 MMOL/L (ref 3–18)
AST SERPL-CCNC: 14 U/L (ref 10–38)
BILIRUB SERPL-MCNC: 0.5 MG/DL (ref 0.2–1)
BUN SERPL-MCNC: 22 MG/DL (ref 7–18)
BUN/CREAT SERPL: 22 (ref 12–20)
CALCIUM SERPL-MCNC: 9.7 MG/DL (ref 8.5–10.1)
CHLORIDE SERPL-SCNC: 109 MMOL/L (ref 100–111)
CHOLEST SERPL-MCNC: 137 MG/DL
CO2 SERPL-SCNC: 30 MMOL/L (ref 21–32)
CREAT SERPL-MCNC: 1.02 MG/DL (ref 0.6–1.3)
GLOBULIN SER CALC-MCNC: 3.2 G/DL (ref 2–4)
GLUCOSE SERPL-MCNC: 93 MG/DL (ref 74–99)
HDLC SERPL-MCNC: 57 MG/DL (ref 40–60)
HDLC SERPL: 2.4 {RATIO} (ref 0–5)
LDLC SERPL CALC-MCNC: 61 MG/DL (ref 0–100)
LIPID PROFILE,FLP: NORMAL
POTASSIUM SERPL-SCNC: 3.7 MMOL/L (ref 3.5–5.5)
PROT SERPL-MCNC: 6.9 G/DL (ref 6.4–8.2)
SODIUM SERPL-SCNC: 145 MMOL/L (ref 136–145)
T4 FREE SERPL-MCNC: 0.9 NG/DL (ref 0.7–1.5)
TRIGL SERPL-MCNC: 95 MG/DL (ref ?–150)
TSH SERPL DL<=0.05 MIU/L-ACNC: 0.59 UIU/ML (ref 0.36–3.74)
VLDLC SERPL CALC-MCNC: 19 MG/DL

## 2019-08-26 PROCEDURE — 84480 ASSAY TRIIODOTHYRONINE (T3): CPT

## 2019-08-26 PROCEDURE — 80053 COMPREHEN METABOLIC PANEL: CPT

## 2019-08-26 PROCEDURE — 84439 ASSAY OF FREE THYROXINE: CPT

## 2019-08-26 PROCEDURE — 84443 ASSAY THYROID STIM HORMONE: CPT

## 2019-08-26 PROCEDURE — 80061 LIPID PANEL: CPT

## 2019-08-26 PROCEDURE — 36415 COLL VENOUS BLD VENIPUNCTURE: CPT

## 2019-08-27 LAB — T3 SERPL-MCNC: 135 NG/DL (ref 71–180)

## 2019-08-28 VITALS
HEART RATE: 80 BPM | RESPIRATION RATE: 18 BRPM | SYSTOLIC BLOOD PRESSURE: 146 MMHG | DIASTOLIC BLOOD PRESSURE: 88 MMHG | HEIGHT: 63 IN | BODY MASS INDEX: 25.34 KG/M2 | OXYGEN SATURATION: 97 % | WEIGHT: 143 LBS | TEMPERATURE: 96.5 F

## 2019-09-24 ENCOUNTER — OFFICE VISIT (OUTPATIENT)
Dept: FAMILY MEDICINE CLINIC | Age: 74
End: 2019-09-24

## 2019-09-24 ENCOUNTER — TELEPHONE (OUTPATIENT)
Dept: NEUROLOGY | Age: 74
End: 2019-09-24

## 2019-09-24 ENCOUNTER — TELEPHONE (OUTPATIENT)
Dept: FAMILY MEDICINE CLINIC | Age: 74
End: 2019-09-24

## 2019-09-24 VITALS
TEMPERATURE: 95.3 F | WEIGHT: 140 LBS | OXYGEN SATURATION: 98 % | RESPIRATION RATE: 18 BRPM | HEIGHT: 63 IN | DIASTOLIC BLOOD PRESSURE: 78 MMHG | SYSTOLIC BLOOD PRESSURE: 122 MMHG | BODY MASS INDEX: 24.8 KG/M2 | HEART RATE: 80 BPM

## 2019-09-24 DIAGNOSIS — R82.90 ABNORMAL URINE ODOR: Primary | ICD-10-CM

## 2019-09-24 DIAGNOSIS — N18.30 CKD (CHRONIC KIDNEY DISEASE) STAGE 3, GFR 30-59 ML/MIN (HCC): ICD-10-CM

## 2019-09-24 DIAGNOSIS — I10 HYPERTENSION GOAL BP (BLOOD PRESSURE) < 130/80: ICD-10-CM

## 2019-09-24 DIAGNOSIS — R41.3 MEMORY DEFICIT: ICD-10-CM

## 2019-09-24 DIAGNOSIS — E03.9 HYPOTHYROIDISM, UNSPECIFIED TYPE: ICD-10-CM

## 2019-09-24 LAB
BILIRUB UR QL STRIP: NORMAL
GLUCOSE UR-MCNC: NEGATIVE MG/DL
KETONES P FAST UR STRIP-MCNC: NORMAL MG/DL
PH UR STRIP: 5.5 [PH] (ref 4.6–8)
PROT UR QL STRIP: NORMAL
SP GR UR STRIP: 1.03 (ref 1–1.03)
UA UROBILINOGEN AMB POC: NORMAL (ref 0.2–1)
URINALYSIS CLARITY POC: CLEAR
URINALYSIS COLOR POC: NORMAL
URINE BLOOD POC: NEGATIVE
URINE LEUKOCYTES POC: NEGATIVE
URINE NITRITES POC: NEGATIVE

## 2019-09-24 NOTE — TELEPHONE ENCOUNTER
Spoke to Mariluz Jimenes at [address] [place] [address] because patient states they have contacted their office asking about the protocol for the iodine allergy. Was informed that patient needs to have allergy protocol instructions from radiology. Asked Mariluz Jimenes if the patient was instructed on how to contact radiology or if radiology was asked to give the instructions to the patient but she was unsure. Requested that she submit a message to Dr Ben Landers nurse to contact patient in reference to the allergy protocol.

## 2019-09-24 NOTE — PROGRESS NOTES
Follow Up Visit Note    Chief Complaint   Patient presents with    Hypertension    Results       HPI:  Ronen Rubi is a 68 y.o.  female  has a past medical history of Bronchitis, Depression, Dry hair, Glaucoma, Hemorrhoids, Hypertension, Hypothyroidism, Obesity (BMI 30.0-34.9), Osteoarthritis, and Stroke ([facility] [address] (1994). is here for the above complaint(s). Hypertension  Patient is currently taking   Key CAD CHF Meds             aspirin delayed-release 325 mg tablet (Taking) Take 1 Tab by mouth daily. amLODIPine (NORVASC) 5 mg tablet (Taking) Take 1 Tab by mouth nightly for 180 days. atorvastatin (LIPITOR) 40 mg tablet (Taking) Take 1 Tab by mouth nightly for 180 days. . BP today is   BP Readings from Last 1 Encounters:   09/24/19 122/78     Patient has been taking medications as prescribed. Patient is/is not checking BP at home. Patient's BP averages 120's/70-80. Patient does  follow low salt diet. Patient is not currently exercising. Patient denies chest pain, shortness of breath, palpitations, lower extremity edema, dizziness/lightheadedness or syncopal episodes. Cough  Patient has had about 1 week of cold symptoms. Patient states that she has had a mild cough and chest congestion. She states that she will 'occasionally' cough up mucous. She feels this has actually improved over the last few days. She denies any fevers/chills, abdominal pain, chest pain, shortness of breath, n/v/d. Abnormal Urine Odor  Patient has ongoing urinary odor. Patient denies any urinary frequency, burning or hematuria. Patient denies any fevers/chills, loss of bowel or bladder control. Neurology Follow-up  Patient saw Dr. Agnes Guajardo and CTA of head/neck was ordered. Patient's son states that she is allergic to the contrast dye. He called the neurology office as advised by radiology, but they told him that radiology is supposed to prescribe her this.  They have not heard from the radiology dept at [facility], so this has not been completed. Lab Review:  8/26/2019 12:57 PM - Jose Luis, Lab In [facility]     Component Value Flag Ref Range Units Status   TSH 0.59   0.36 - 3.74 uIU/mL Final     Component Value Flag Ref Range Units Status   T4, Free 0.9   0.7 - 1.5 NG/DL Final         Review of Systems   Constitutional: Negative for chills, fever, malaise/fatigue and weight loss. HENT: Negative for congestion, ear discharge, ear pain, hearing loss, nosebleeds, sinus pain, sore throat and tinnitus. Eyes: Negative for blurred vision, double vision and pain. Respiratory: Positive for cough and sputum production. Negative for hemoptysis, shortness of breath, wheezing and stridor. Cardiovascular: Negative for chest pain, palpitations, orthopnea, claudication and leg swelling. Gastrointestinal: Negative for abdominal pain, constipation, diarrhea, nausea and vomiting. Genitourinary: Negative for dysuria, flank pain, frequency, hematuria and urgency. Neurological: Negative for dizziness, tingling and headaches. Current Outpatient Medications   Medication Sig    aspirin delayed-release 325 mg tablet Take 1 Tab by mouth daily.  mirtazapine (REMERON) 30 mg tablet Take 1 Tab by mouth nightly for 180 days.  amLODIPine (NORVASC) 5 mg tablet Take 1 Tab by mouth nightly for 180 days.  atorvastatin (LIPITOR) 40 mg tablet Take 1 Tab by mouth nightly for 180 days.  timolol (TIMOPTIC) 0.5 % ophthalmic solution Administer 1 Drop to both eyes two (2) times a day.  cholecalciferol (VITAMIN D3) 1,000 unit cap Take 5,000 Units by mouth daily.  b complex vitamins tablet Take 1 Tab by mouth daily. No current facility-administered medications for this visit.       Health Maintenance   Topic Date Due    Hepatitis C Screening  1945    Shingrix Vaccine Age 50> (1 of 2) 11/25/1995    GLAUCOMA SCREENING Q2Y  11/25/2010    Bone Densitometry (Dexa) Screening  11/25/2010    Pneumococcal 65+ years (2 of 2 - PPSV23) 11/25/2011    COLONOSCOPY  08/07/2016    Influenza Age 9 to Adult  08/01/2019    BREAST CANCER SCRN MAMMOGRAM  12/20/2020 (Originally 11/25/1995)    DTaP/Tdap/Td series (2 - Td) 11/22/2027     Immunization History   Administered Date(s) Administered    Pneumococcal Conjugate (PCV-13) 11/25/2010       Allergies and Medications: Reviewed and updated in EMR. Past Medical History:   Diagnosis Date    Bronchitis     Depression     Dry hair     Glaucoma     Hemorrhoids     Hypertension     Hypothyroidism     Obesity (BMI 30.0-34. 9)     Osteoarthritis     RIGHT KNEE    Stroke [facility]) 1994    DIZZINESS        Surgical History: Reviewed and updated in EMR as appropriate. Social History: Reviewed and updated in EMR as appropriate. Family History: Reviewed and updated in EMR as appropriate. OBJECTIVE:   Visit Vitals  /78   Pulse 80   Temp 95.3 °F (35.2 °C) (Oral)   Resp 18   Ht 5' 3\" (1.6 m)   Wt 140 lb (63.5 kg)   LMP  (LMP Unknown)   SpO2 98%   BMI 24.80 kg/m²        Physical Exam   Constitutional: She is oriented to person, place, and time and well-developed, well-nourished, and in no distress. No distress. Neck: Normal range of motion. Neck supple. No thyromegaly present. Cardiovascular: Normal rate, regular rhythm, normal heart sounds and intact distal pulses. Exam reveals no gallop and no friction rub. No murmur heard. Pulmonary/Chest: Effort normal and breath sounds normal. No respiratory distress. She has no wheezes. She has no rales. She exhibits no tenderness. Lymphadenopathy:     She has no cervical adenopathy. Neurological: She is alert and oriented to person, place, and time. Skin: Skin is warm and dry. She is not diaphoretic. Nursing note and vitals reviewed.       LABS/RADIOLOGICAL TESTS:  Lab Results   Component Value Date/Time    WBC 13.7 (H) 03/20/2019 05:25 AM    HGB 14.3 03/20/2019 05:25 AM    HCT 43.9 03/20/2019 05:25 AM    PLATELET 502 73/56/2909 05:25 AM Lab Results   Component Value Date/Time    Sodium 145 08/26/2019 11:50 AM    Potassium 3.7 08/26/2019 11:50 AM    Chloride 109 08/26/2019 11:50 AM    CO2 30 08/26/2019 11:50 AM    Glucose 93 08/26/2019 11:50 AM    BUN 22 (H) 08/26/2019 11:50 AM    Creatinine 1.02 08/26/2019 11:50 AM     Lab Results   Component Value Date/Time    Cholesterol, total 137 08/26/2019 11:50 AM    HDL Cholesterol 57 08/26/2019 11:50 AM    LDL, calculated 61 08/26/2019 11:50 AM    Triglyceride 95 08/26/2019 11:50 AM     No results found for: GPT  Lab Results   Component Value Date/Time    Hemoglobin A1c 5.8 03/20/2019 05:25 AM         All lab results and radiological studies were reviewed and discussed with the patient. ASSESSMENT/PLAN:    Diagnoses and all orders for this visit:    1. Abnormal urine odor   -     AMB POC URINALYSIS DIP STICK AUTO W/O MICRO - Advised patient to increase fluid intake. Recommend at least 64 oz of water intake. Patient     2. Hypertension goal BP (blood pressure) < 130/80  Continue with   Key CAD CHF Meds             aspirin delayed-release 325 mg tablet (Taking) Take 1 Tab by mouth daily. amLODIPine (NORVASC) 5 mg tablet (Taking) Take 1 Tab by mouth nightly for 180 days. atorvastatin (LIPITOR) 40 mg tablet (Taking) Take 1 Tab by mouth nightly for 180 days. 1) Goal blood pressure less than equal to 140/90 mmHg, goal BP can vary depending on risk factors as discussed. 2) Lifestyle modifications discussed with patient, low sodium <2 gm, low salt , DASH diet  3) Exercise for at least 30 min 3-5 times a week for goal BMI of less than or equal  To 25.  4) Continue current medications as prescribed. 5) Please begin medication as discussed for better blood pressure control, explained side effects and patient verbalized understanding. 6) Goal LDL<100.  7) Please monitor your blood pressure and keep a log to bring in with you at each visit.   8) Discussed risk factors with patient such as CAD, FAST of stroke symptoms, pt verbalizes understanding. 9) Please avoid smoking , alcohol and any illicit drug use if applicable to you. 10) Discussed lifestyle modifications ,dietary control and BP monitoring at home as patient does not wish to begin an antihypertension agent at present. 3. Memory deficit  LPN called neurology office at [facility] and put in a note for Dr. Basilia Velasquez nurse to call patient and give further instructions on what patient should do for iodine protocals. Explained to patient's son that Neurology will call about iodine prescription. 4. Hypothyroidism  Patient not currently taking any medications at this time for thyroid disease. Recent levels are WNL. 5. Cough   Resolving, advised to start Mucinex OTC for chest congestion. ED and return precautions given to patient today. 6. CKD, stage 3, GFR 30-59  Continue to monitor. Recommend increasing fluid intake as stated above. Patient verbalized understanding and agreement with the plan. Patient was given an after-visit summary. Follow-up in 3 months or sooner if worsening symptoms. More than 50% of this 40  min visit was spent counseling the patient face to face about etiology and treatment of health conditions outlined in assessment and plan        Jackie Anand PA-C  [address], [address] [place] [address], [address] [place] [id number]  [facility] [phone]

## 2019-09-24 NOTE — TELEPHONE ENCOUNTER
Nanci Solomon from [employer] states that pt has tried to contact our office regarding a new order for CTA without contrast due to her allergy to iodine. She requests we contact pt to inform her if new order has been generated and what facility she needs to call to obtain allergy protocol.

## 2019-09-24 NOTE — TELEPHONE ENCOUNTER
Given this is not an urgent matter and patient is not familiar to me, this can wait until Dr. Kyree Martin return.

## 2019-09-24 NOTE — PROGRESS NOTES
Chief Complaint   Patient presents with    Hypertension    Results     1. Have you been to the ER, urgent care clinic since your last visit? Hospitalized since your last visit? No    2. Have you seen or consulted any other health care providers outside of the [address] [place] [address] since your last visit? Include any pap smears or colon screening.  No

## 2019-12-05 ENCOUNTER — TELEPHONE (OUTPATIENT)
Dept: FAMILY MEDICINE CLINIC | Age: 74
End: 2019-12-05

## 2019-12-05 NOTE — TELEPHONE ENCOUNTER
Neel Hughes Jerman's son called and would like to speak to [address] [place] [address]. He said he has been expecting a call from Neuroscience, but they have never contacted him. He saids Edie Garvey told him to let us know if this happens.

## 2019-12-05 NOTE — TELEPHONE ENCOUNTER
Please give patient's son the number to the [phone] [facility] that is in the referrals. [address], NP (headaches)  MD Dr. Vin Rich MD  [address] [facility] [address], [facility], [address]  Phone: [phone]    Gayathri Anand PA-C  [facility]  [phone]  [address]

## 2020-08-21 PROBLEM — I10 ACCELERATED HYPERTENSION: Status: ACTIVE | Noted: 2020-08-21

## 2020-08-21 PROBLEM — I61.4 CEREBELLAR BLEED (HCC): Status: ACTIVE | Noted: 2020-08-21

## 2020-08-21 PROBLEM — N17.9 ACUTE RENAL FAILURE (ARF) (HCC): Status: ACTIVE | Noted: 2020-08-21

## 2020-08-21 PROBLEM — M62.82 RHABDOMYOLYSIS: Status: ACTIVE | Noted: 2020-08-21

## 2022-04-02 ASSESSMENT — KERATOMETRY
OD_K1POWER_DIOPTERS: 44.25
OD_AXISANGLE2_DEGREES: 098
OS_AXISANGLE2_DEGREES: 102
OS_K2POWER_DIOPTERS: 43.75
OD_AXISANGLE_DEGREES: 008
OD_K2POWER_DIOPTERS: 43.00
OS_K1POWER_DIOPTERS: 44.00
OS_AXISANGLE_DEGREES: 012

## 2022-04-02 ASSESSMENT — VISUAL ACUITY
OS_CC: 20/100+2
OS_CC: 20/70-1
OD_GLARE: 20/80
OD_CC: 20/25
OD_CC: 20/30
OS_GLARE: 20/400
OD_GLARE: 20/80
OS_GLARE: 20/400

## 2022-04-02 ASSESSMENT — TONOMETRY
OS_IOP_MMHG: 20
OS_IOP_MMHG: 20
OD_IOP_MMHG: 20
OD_IOP_MMHG: 19